# Patient Record
Sex: MALE | Race: WHITE | NOT HISPANIC OR LATINO | Employment: FULL TIME | URBAN - METROPOLITAN AREA
[De-identification: names, ages, dates, MRNs, and addresses within clinical notes are randomized per-mention and may not be internally consistent; named-entity substitution may affect disease eponyms.]

---

## 2017-11-09 ENCOUNTER — OFFICE VISIT (OUTPATIENT)
Dept: FAMILY MEDICINE | Facility: CLINIC | Age: 37
End: 2017-11-09
Payer: COMMERCIAL

## 2017-11-09 VITALS
TEMPERATURE: 98.8 F | DIASTOLIC BLOOD PRESSURE: 80 MMHG | WEIGHT: 182.2 LBS | BODY MASS INDEX: 26.08 KG/M2 | HEIGHT: 70 IN | HEART RATE: 60 BPM | SYSTOLIC BLOOD PRESSURE: 110 MMHG

## 2017-11-09 DIAGNOSIS — Z00.00 ROUTINE GENERAL MEDICAL EXAMINATION AT A HEALTH CARE FACILITY: Primary | ICD-10-CM

## 2017-11-09 LAB
CHOLEST SERPL-MCNC: 205 MG/DL
GLUCOSE SERPL-MCNC: 89 MG/DL (ref 70–99)
HDLC SERPL-MCNC: 49 MG/DL
LDLC SERPL CALC-MCNC: 91 MG/DL
NONHDLC SERPL-MCNC: 156 MG/DL
TRIGL SERPL-MCNC: 323 MG/DL

## 2017-11-09 PROCEDURE — 36415 COLL VENOUS BLD VENIPUNCTURE: CPT | Performed by: NURSE PRACTITIONER

## 2017-11-09 PROCEDURE — 82947 ASSAY GLUCOSE BLOOD QUANT: CPT | Performed by: NURSE PRACTITIONER

## 2017-11-09 PROCEDURE — 80061 LIPID PANEL: CPT | Performed by: NURSE PRACTITIONER

## 2017-11-09 PROCEDURE — 99395 PREV VISIT EST AGE 18-39: CPT | Performed by: NURSE PRACTITIONER

## 2017-11-09 NOTE — MR AVS SNAPSHOT
After Visit Summary   11/9/2017    John Nichols    MRN: [id number]           Patient Information     Date Of Birth          1980        Visit Information        Provider Department      11/9/2017 9:30 AM Edwige Avalos APRN CNP [facility]        Today's Diagnoses     Routine general medical examination at a health care facility    -  1      Care Instructions      Preventive Health Recommendations  Male Ages 26 - 39    Yearly exam:             See your health care provider every year in order to  o   Review health changes.   o   Discuss preventive care.    o   Review your medicines if your doctor has prescribed any.    You should be tested each year for STDs (sexually transmitted diseases), if you re at risk.     After age 35, talk to your provider about cholesterol testing. If you are at risk for heart disease, have your cholesterol tested at least every 5 years.     If you are at risk for diabetes, you should have a diabetes test (fasting glucose).  Shots: Get a flu shot each year. Get a tetanus shot every 10 years.     Nutrition:    Eat at least 5 servings of fruits and vegetables daily.     Eat whole-grain bread, whole-wheat pasta and brown rice instead of white grains and rice.     Talk to your provider about Calcium and Vitamin D.     Lifestyle    Exercise for at least 150 minutes a week (30 minutes a day, 5 days a week). This will help you control your weight and prevent disease.     Limit alcohol to one drink per day.     No smoking.     Wear sunscreen to prevent skin cancer.     See your dentist every six months for an exam and cleaning.             Follow-ups after your visit        Who to contact     If you have questions or need follow up information about today's clinic visit or your schedule please contact [facility] directly at [phone].  Normal or non-critical lab and imaging results will be communicated to you by MyChart, letter or  "phone within 4 business days after the clinic has received the results. If you do not hear from us within 7 days, please contact the clinic through [facility] or phone. If you have a critical or abnormal lab result, we will notify you by phone as soon as possible.  Submit refill requests through [facility] or call your pharmacy and they will forward the refill request to us. Please allow 3 business days for your refill to be completed.          Additional Information About Your Visit        [employer] Information     [facility] lets you send messages to your doctor, view your test results, renew your prescriptions, schedule appointments and more. To sign up, go to [email or web address] . Click on \"Log in\" on the left side of the screen, which will take you to the Welcome page. Then click on \"Sign up Now\" on the right side of the page.     You will be asked to enter the access code listed below, as well as some personal information. Please follow the directions to create your username and password.     Your access code is: [email or web address]  Expires: 2018 11:28 AM     Your access code will  in 90 days. If you need help or a new code, please call your [facility] clinic or [phone].        Care EveryWhere ID     This is your Care EveryWhere ID. This could be used by other organizations to access your [facility] medical records  [id number]        Your Vitals Were     Pulse Temperature Height BMI (Body Mass Index)          60 98.8  F (37.1  C) (Tympanic) 5' 9.5\" (1.765 m) 26.52 kg/m2         Blood Pressure from Last 3 Encounters:   17 110/80   14 115/60   12 104/42    Weight from Last 3 Encounters:   17 182 lb 3.2 oz (82.6 kg)   14 179 lb 6.4 oz (81.4 kg)   12 161 lb 8 oz (73.3 kg)              We Performed the Following     Glucose     Lipid panel reflex to direct LDL Fasting        Primary Care Provider Office Phone # Fax #    Gregory G Schoen, [phone]       [phone] [place] [address]        Equal Access to Services     THALIA PRATT : Hadii mike Sepulveda, dennis jeffrey, minnie rankin, jaleesa suggs. So [facility] [phone].    ATENCIÓN: Si habla español, tiene a catalan disposición servicios gratuitos de asistencia lingüística. Llame al [phone].    We comply with applicable federal civil rights laws and [place] laws. We do not discriminate on the basis of race, color, national origin, age, disability, sex, sexual orientation, or gender identity.            Thank you!     Thank you for choosing [facility]  for your care. Our goal is always to provide you with excellent care. Hearing back from our patients is one way we can continue to improve our services. Please take a few minutes to complete the written survey that you may receive in the mail after your visit with us. Thank you!             Your Updated Medication List - Protect others around you: Learn how to safely use, store and throw away your medicines at www.disposemymeds.org.      Notice  As of 11/9/2017 11:59 PM    You have not been prescribed any medications.

## 2017-11-09 NOTE — NURSING NOTE
"Chief Complaint   Patient presents with     Physical       Initial There were no vitals taken for this visit. Estimated body mass index is 26.11 kg/(m^2) as calculated from the following:    Height as of 9/4/12: 5' 9.5\" (1.765 m).    Weight as of 6/25/14: 179 lb 6.4 oz (81.4 kg).  Medication Reconciliation: complete  "
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
## 2018-02-23 ENCOUNTER — OFFICE VISIT (OUTPATIENT)
Dept: FAMILY MEDICINE | Facility: CLINIC | Age: 38
End: 2018-02-23
Payer: COMMERCIAL

## 2018-02-23 VITALS
WEIGHT: 182 LBS | HEART RATE: 82 BPM | DIASTOLIC BLOOD PRESSURE: 60 MMHG | SYSTOLIC BLOOD PRESSURE: 110 MMHG | TEMPERATURE: 97.3 F | OXYGEN SATURATION: 100 % | HEIGHT: 69 IN | BODY MASS INDEX: 26.96 KG/M2

## 2018-02-23 DIAGNOSIS — B35.1 ONYCHOMYCOSIS: Primary | ICD-10-CM

## 2018-02-23 PROCEDURE — 99213 OFFICE O/P EST LOW 20 MIN: CPT | Performed by: NURSE PRACTITIONER

## 2018-02-23 RX ORDER — TERBINAFINE HYDROCHLORIDE 250 MG/1
250 TABLET ORAL DAILY
Qty: 90 TABLET | Refills: 0 | Status: SHIPPED | OUTPATIENT
Start: 2018-02-23 | End: 2018-09-19

## 2018-02-23 NOTE — PROGRESS NOTES
"  SUBJECTIVE:   John Nichols is a 37 year old male who presents to clinic today for the following health issues:      Concern - toenail fungus  Onset: ongoing for years    Description:   All toenails, increased fungal infection    Intensity: moderate    Progression of Symptoms:  worsening    Accompanying Signs & Symptoms:  Painful, yellow spots in the nail    Previous history of similar problem:   Has had fungus for years, has not had any Rx for this    Precipitating factors:   Worsened by: none    Alleviating factors:  Improved by: none    Therapies Tried and outcome: none    Patient reports having had an issue with toenail fungus since his teenage years.  He has never taken oral medication for this, but has discussed it in the past with his primary care provider.  The worst problem involves the right great toe.  He states as this grows out it becomes very thick and yellow.  He will trim it back, and now it seems to have pretty much disintegrated, to the point that when he trims it there is almost nothing left on the toe.  It actually becomes painful because he has no protective nail.    Problem list and histories reviewed & adjusted, as indicated.  Additional history: as documented    BP Readings from Last 3 Encounters:   02/23/18 110/60   11/09/17 110/80   06/25/14 115/60    Wt Readings from Last 3 Encounters:   02/23/18 182 lb (82.6 kg)   11/09/17 182 lb 3.2 oz (82.6 kg)   06/25/14 179 lb 6.4 oz (81.4 kg)                    Reviewed and updated as needed this visit by clinical staff       Reviewed and updated as needed this visit by Provider         ROS:  Constitutional, HEENT, cardiovascular, pulmonary, gi and gu systems are negative, except as otherwise noted.    OBJECTIVE:     /60  Pulse 82  Temp 97.3  F (36.3  C) (Temporal)  Ht 5' 9\" (1.753 m)  Wt 182 lb (82.6 kg)  SpO2 100%  BMI 26.88 kg/m2  Body mass index is 26.88 kg/(m^2).   GENERAL: healthy, alert and no distress  RESP: lungs clear " to auscultation - no rales, rhonchi or wheezes  CV: regular rate and rhythm, normal S1 S2, no S3 or S4, no murmur, click or rub, no peripheral edema and peripheral pulses strong  ABDOMEN: soft, nontender, no hepatosplenomegaly, no masses and bowel sounds normal  SKIN: The nail of the right great toe was pretty much nonexistent.  There is some debris in very discolored nail left at the base.  There is yellow debris beneath the left great toenail, some white discoloration and very brittle appearance to the rest of the toenails.        ASSESSMENT/PLAN:     Problem List Items Addressed This Visit        Medium    Onychomycosis - Primary    Relevant Medications    terbinafine (LAMISIL) 250 MG tablet    Other Relevant Orders    Hepatic panel (Albumin, ALT, AST, Bili, Alk Phos, TP)           Terbinafine 250 mg 1 tablet daily for 12 weeks  Potential side effects were reviewed with the patient  Will obtain baseline liver function tests today, recheck in 1 month.    LENNIE Chau [facility]

## 2018-02-23 NOTE — LETTER
[address]   [address]  Tel. [phone]/ Fax [phone]    May 23, 2018        John Nichols  [address] [address] [place] [address]          Dear Mr. John Nichols:    Your previous orders for lab work have been extended.  Please call to schedule a lab appointment and return to the clinic to have the labs drawn at your earliest convenience.    If you are required to be fasting for these tests,  remember to have nothing by mouth (except water) after midnight on the night before your test.    If you have any questions or concerns, please contact our office.    Sincerely,       Edwieg Avalos, NP care team

## 2018-02-23 NOTE — MR AVS SNAPSHOT
"              After Visit Summary   2/23/2018    John Nichols    MRN: [id number]           Patient Information     Date Of Birth          1980        Visit Information        Provider Department      2/23/2018 10:00 AM Edwige Avalos APRN CNP [facility]        Today's Diagnoses     Onychomycosis    -  1       Follow-ups after your visit        Future tests that were ordered for you today     Open Future Orders        Priority Expected Expires Ordered    Hepatic panel (Albumin, ALT, AST, Bili, Alk Phos, TP) Routine 3/23/2018 4/6/2018 2/23/2018            Who to contact     If you have questions or need follow up information about today's clinic visit or your schedule please contact [facility] directly at [phone].  Normal or non-critical lab and imaging results will be communicated to you by MyChart, letter or phone within 4 business days after the clinic has received the results. If you do not hear from us within 7 days, please contact the clinic through MyChart or phone. If you have a critical or abnormal lab result, we will notify you by phone as soon as possible.  Submit refill requests through [email or web address] or call your pharmacy and they will forward the refill request to us. Please allow 3 business days for your refill to be completed.          Additional Information About Your Visit        MyChart Information     [email or web address] lets you send messages to your doctor, view your test results, renew your prescriptions, schedule appointments and more. To sign up, go to [email or web address] . Click on \"Log in\" on the left side of the screen, which will take you to the Welcome page. Then click on \"Sign up Now\" on the right side of the page.     You will be asked to enter the access code listed below, as well as some personal information. Please follow the directions to create your username and password.     Your access code is: [email or web address]  Expires: 5/24/2018 10:33 AM   " "  Your access code will  in 90 days. If you need help or a new code, please call your [place] clinic or [phone].        Care EveryWhere ID     This is your Care EveryWhere ID. This could be used by other organizations to access your [place] medical records  [id number]        Your Vitals Were     Pulse Temperature Height Pulse Oximetry BMI (Body Mass Index)       82 97.3  F (36.3  C) (Temporal) 5' 9\" (1.753 m) 100% 26.88 kg/m2        Blood Pressure from Last 3 Encounters:   18 110/60   17 110/80   14 115/60    Weight from Last 3 Encounters:   18 182 lb (82.6 kg)   17 182 lb 3.2 oz (82.6 kg)   14 179 lb 6.4 oz (81.4 kg)              We Performed the Following     Hepatic panel (Albumin, ALT, AST, Bili, Alk Phos, TP)          Today's Medication Changes          These changes are accurate as of 18 10:33 AM.  If you have any questions, ask your nurse or doctor.               Start taking these medicines.        Dose/Directions    terbinafine 250 MG tablet   Commonly known as:  lamISIL   Used for:  Onychomycosis   Started by:  Edwige Avalos APRN CNP        Dose:  250 mg   Take 1 tablet (250 mg) by mouth daily   Quantity:  90 tablet   Refills:  0            Where to get your medicines      These medications were sent to [facility] [place] [address] [address]  [facility] [address]     Phone:  [phone]     terbinafine 250 MG tablet                Primary Care Provider Office Phone # Fax #    Gregory G Schoen, [phone]       [facility] [place] [facility] [address]        Equal Access to Services     [facility] SANTA AH: Ricardo Sepulveda, waaxda luqadaha, qaybta kaalmada massiel, jaleesa suggs. So [facility] [phone].    ATENCIÓN: Si habla español, tiene a catalan disposición servicios gratuitos de asistencia lingüística. Jewel al [phone].    We comply with " applicable federal civil rights laws and [place] laws. We do not discriminate on the basis of race, color, national origin, age, disability, sex, sexual orientation, or gender identity.            Thank you!     Thank you for choosing [facility]  for your care. Our goal is always to provide you with excellent care. Hearing back from our patients is one way we can continue to improve our services. Please take a few minutes to complete the written survey that you may receive in the mail after your visit with us. Thank you!             Your Updated Medication List - Protect others around you: Learn how to safely use, store and throw away your medicines at www.disposemymeds.org.          This list is accurate as of 2/23/18 10:33 AM.  Always use your most recent med list.                   Brand Name Dispense Instructions for use Diagnosis    terbinafine 250 MG tablet    lamISIL    90 tablet    Take 1 tablet (250 mg) by mouth daily    Onychomycosis

## 2018-02-23 NOTE — LETTER
[address]   [address]  Tel. [phone]/ Fax [phone]    April 18, 2018        John Nichols  [address] [address] [place] [id number]          Dear Mr. John Nichols:    Your previous orders for lab work have been extended.  Please call to schedule a lab appointment and return to the clinic to have the labs drawn at your earliest convenience.    If you are required to be fasting for these tests,  remember to have nothing by mouth (except water) after midnight on the night before your test.    If you have any questions or concerns, please contact our office.    Sincerely,       Edwige Avalos, NP care team

## 2018-05-02 ENCOUNTER — TELEPHONE (OUTPATIENT)
Dept: FAMILY MEDICINE | Facility: CLINIC | Age: 38
End: 2018-05-02

## 2018-05-02 NOTE — TELEPHONE ENCOUNTER
Prior Authorization Retail Medication Request    Medication/Dose: lamisil 250 mg  ICD code (if different than what is on RX):     Previously Tried and Failed:     Rationale:       Insurance Name:  Preferred one  Insurance ID:  [id number]      Pharmacy Information (if different than what is on RX)  Name:     Phone:

## 2018-05-03 NOTE — TELEPHONE ENCOUNTER
PA Initiation    Medication: LAMISIL 250MG  Insurance Company: [employer] - Phone [phone] Fax [phone]  Pharmacy Filling the Rx: [place] [address] [address]  Filling Pharmacy Phone: [phone]  Filling Pharmacy Fax:    Start Date: 5/3/2018

## 2018-05-10 NOTE — TELEPHONE ENCOUNTER
Prior Authorization Approval    Authorization Effective Date: 5/9/2018  Authorization Expiration Date: 8/9/2018  Medication: LAMISIL 250MG - APPROVED  Approved Dose/Quantity: daily  Reference #: [id number]   Insurance Company: [employer] - Phone [phone] Fax [phone]  Expected CoPay: $11.45     CoPay Card Available:      Foundation Assistance Needed:    Which Pharmacy is filling the prescription (Not needed for infusion/clinic administered): [facility] [address]   Pharmacy Notified: Yes  Patient Notified: No

## 2018-09-19 ENCOUNTER — OFFICE VISIT (OUTPATIENT)
Dept: FAMILY MEDICINE | Facility: OTHER | Age: 38
End: 2018-09-19
Payer: COMMERCIAL

## 2018-09-19 VITALS
BODY MASS INDEX: 26.43 KG/M2 | SYSTOLIC BLOOD PRESSURE: 110 MMHG | TEMPERATURE: 98.1 F | OXYGEN SATURATION: 99 % | RESPIRATION RATE: 16 BRPM | HEART RATE: 77 BPM | WEIGHT: 179 LBS | DIASTOLIC BLOOD PRESSURE: 60 MMHG

## 2018-09-19 DIAGNOSIS — J20.9 ACUTE BRONCHITIS WITH SYMPTOMS > 10 DAYS: Primary | ICD-10-CM

## 2018-09-19 PROCEDURE — 99213 OFFICE O/P EST LOW 20 MIN: CPT | Performed by: NURSE PRACTITIONER

## 2018-09-19 RX ORDER — AZITHROMYCIN 250 MG/1
TABLET, FILM COATED ORAL
Qty: 6 TABLET | Refills: 0 | Status: SHIPPED | OUTPATIENT
Start: 2018-09-19 | End: 2020-10-05

## 2018-09-19 ASSESSMENT — PAIN SCALES - GENERAL: PAINLEVEL: NO PAIN (0)

## 2018-09-19 NOTE — MR AVS SNAPSHOT
After Visit Summary   9/19/2018    John Nichols    MRN: [id number]           Patient Information     Date Of Birth          1980        Visit Information        Provider Department      9/19/2018 2:40 PM Garima Gruber APRN CNP [facility]        Today's Diagnoses     Acute bronchitis with symptoms > 10 days    -  1      Care Instructions    - Start azithormycin medication today  - Recommend OTC cough medication as needed  - Hot water with honey and lemon to help suppress cough or cough drops  - Follow up if symptoms do not improve or worsen  - If you develop fevers/chills, shortness of breath or chest pain please go in to be evaluated.     LENNIE Lowery CNP            Follow-ups after your visit        Follow-up notes from your care team     Return in about 2 months (around 11/19/2018) for Physical Exam.      Who to contact     If you have questions or need follow up information about today's clinic visit or your schedule please contact [facility] directly at [phone].  Normal or non-critical lab and imaging results will be communicated to you by MyChart, letter or phone within 4 business days after the clinic has received the results. If you do not hear from us within 7 days, please contact the clinic through MyChart or phone. If you have a critical or abnormal lab result, we will notify you by phone as soon as possible.  Submit refill requests through [employer] or call your pharmacy and they will forward the refill request to us. Please allow 3 business days for your refill to be completed.          Additional Information About Your Visit        Care EveryWhere ID     This is your Care EveryWhere ID. This could be used by other organizations to access your [place] medical records  [id number]        Your Vitals Were     Pulse Temperature Respirations Pulse Oximetry BMI (Body Mass Index)       77 98.1  F (36.7  C) 16 99% 26.43 kg/m2         Blood Pressure from Last 3 Encounters:   09/19/18 110/60   02/23/18 110/60   11/09/17 110/80    Weight from Last 3 Encounters:   09/19/18 179 lb (81.2 kg)   02/23/18 182 lb (82.6 kg)   11/09/17 182 lb 3.2 oz (82.6 kg)              Today, you had the following     No orders found for display         Today's Medication Changes          These changes are accurate as of 9/19/18  2:58 PM.  If you have any questions, ask your nurse or doctor.               Start taking these medicines.        Dose/Directions    azithromycin 250 MG tablet   Commonly known as:  ZITHROMAX   Used for:  Acute bronchitis with symptoms > 10 days   Started by:  Garima Gruber APRN CNP        Two tablets first day, then one tablet daily for four days.   Quantity:  6 tablet   Refills:  0            Where to get your medicines      These medications were sent to [facility] [address]   [address] [facility] , [facility] [address]     Phone:  [phone]     azithromycin 250 MG tablet                Primary Care Provider Office Phone # Fax #    Edwige LENNIE Alexandra [phone]       [address] [facility]   [facility] [place] [address]        Equal Access to Services     THALIA PRATT AH: Hadii mike pretty hadasho Soomaali, waaxda luqadaha, qaybta kaalmada adeegyada, jaleesa suggs. So [facility] [phone].    ATENCIÓN: Si habla español, tiene a catalan disposición servicios gratuitos de asistencia lingüística. Llame al [phone].    We comply with applicable federal civil rights laws and [place] laws. We do not discriminate on the basis of race, color, national origin, age, disability, sex, sexual orientation, or gender identity.            Thank you!     Thank you for choosing [facility]  for your care. Our goal is always to provide you with excellent care. Hearing back from our patients is one way we can continue to improve our services. Please take a few minutes to complete  the written survey that you may receive in the mail after your visit with us. Thank you!             Your Updated Medication List - Protect others around you: Learn how to safely use, store and throw away your medicines at www.disposemymeds.org.          This list is accurate as of 9/19/18  2:58 PM.  Always use your most recent med list.                   Brand Name Dispense Instructions for use Diagnosis    azithromycin 250 MG tablet    ZITHROMAX    6 tablet    Two tablets first day, then one tablet daily for four days.    Acute bronchitis with symptoms > 10 days

## 2018-09-19 NOTE — PATIENT INSTRUCTIONS
- Start azithormycin medication today  - Recommend OTC cough medication as needed  - Hot water with honey and lemon to help suppress cough or cough drops  - Follow up if symptoms do not improve or worsen  - If you develop fevers/chills, shortness of breath or chest pain please go in to be evaluated.     LENNIE Lowery CNP

## 2018-09-19 NOTE — PROGRESS NOTES
SUBJECTIVE:   John Nichols is a 38 year old male who presents to clinic today for the following health issues:      HPI  Acute Illness   Acute illness concerns: Cough    Onset:   3 wks    Fever: no     Chills/Sweats: no     Headache (location?): no     Sinus Pressure:no    Conjunctivitis:  no    Ear Pain: no    Rhinorrhea: no     Congestion: YES  initially    Sore Throat: YES  Initially - not now     Cough: YES-productive of clear sputum,  Coughing so hard that he threw up yesterday morning worsening over time    Wheeze: no     Decreased Appetite: no     Nausea: no     Vomiting: no     Diarrhea:  no     Dysuria/Freq.: no     Fatigue/Achiness: YES   At first    Sick/Strep Exposure: no      Therapies Tried and outcome:   None            Problem list and histories reviewed & adjusted, as indicated.  Additional history: as documented      Current Outpatient Prescriptions   Medication Sig Dispense Refill     azithromycin (ZITHROMAX) 250 MG tablet Two tablets first day, then one tablet daily for four days. 6 tablet 0       ROS:  CONSTITUTIONAL: NEGATIVE for fever, chills, change in weight  RESP: NEGATIVE for SOB  CV: NEGATIVE for chest pain, palpitations or peripheral edema    OBJECTIVE:     /60  Pulse 77  Temp 98.1  F (36.7  C)  Resp 16  Wt 179 lb (81.2 kg)  SpO2 99%  BMI 26.43 kg/m2  Body mass index is 26.43 kg/(m^2).  GENERAL: healthy, alert and no distress  EYES: Eyes grossly normal to inspection, PERRL and conjunctivae and sclerae normal  HENT: ear canals and TM's normal, nose and mouth without ulcers or lesions  NECK: no adenopathy, no asymmetry, masses, or scars and thyroid normal to palpation  RESP: lungs clear to auscultation - no rales, rhonchi or wheezes  CV: regular rate and rhythm, normal S1 S2, no S3 or S4, no murmur, click or rub, no peripheral edema and peripheral pulses strong  SKIN: no suspicious lesions or rashes  NEURO: Normal strength and tone, mentation intact and speech  normal  PSYCH: mentation appears normal, affect normal/bright    Diagnostic Test Results:  none     ASSESSMENT/PLAN:         1. Acute bronchitis with symptoms > 10 days  - Symptoms consistent with an acute URI, with ongoing cough that is productive. He denies shortness of breath, fatigue, fevers, and reports that he is feeling mildly better with an aggravating cough that caused him to almost vomit yesterday. He denies any seasonal allergies. At this time I will cover him for atypical bacteria with azithromycin. Counseled him on home care remedies and advised to return to clinic if symptoms worsen or do not improve (see patient instructions).   - azithromycin (ZITHROMAX) 250 MG tablet; Two tablets first day, then one tablet daily for four days.  Dispense: 6 tablet; Refill: 0    Reminded him to schedule physical exam, this is due for him in November.     See Patient Instructions  Patient Instructions   - Start azithormycin medication today  - Recommend OTC cough medication as needed  - Hot water with honey and lemon to help suppress cough or cough drops  - Follow up if symptoms do not improve or worsen  - If you develop fevers/chills, shortness of breath or chest pain please go in to be evaluated.     LENNIE Lowery CNP, APRN CNP  [facility]

## 2020-10-05 ENCOUNTER — OFFICE VISIT (OUTPATIENT)
Dept: FAMILY MEDICINE | Facility: CLINIC | Age: 40
End: 2020-10-05
Payer: COMMERCIAL

## 2020-10-05 VITALS
HEART RATE: 91 BPM | DIASTOLIC BLOOD PRESSURE: 79 MMHG | OXYGEN SATURATION: 98 % | HEIGHT: 68 IN | BODY MASS INDEX: 26.6 KG/M2 | TEMPERATURE: 97.9 F | RESPIRATION RATE: 16 BRPM | SYSTOLIC BLOOD PRESSURE: 120 MMHG | WEIGHT: 175.5 LBS

## 2020-10-05 DIAGNOSIS — L72.0 EPIDERMOID CYST OF SKIN: Primary | ICD-10-CM

## 2020-10-05 PROCEDURE — 99212 OFFICE O/P EST SF 10 MIN: CPT | Performed by: NURSE PRACTITIONER

## 2020-10-05 ASSESSMENT — PAIN SCALES - GENERAL: PAINLEVEL: NO PAIN (0)

## 2020-10-05 ASSESSMENT — MIFFLIN-ST. JEOR: SCORE: 1680.56

## 2020-10-05 NOTE — PATIENT INSTRUCTIONS
Patient Education     Understanding Epidermoid Cyst    An epidermoid cyst is a small abnormal growth in the top layers of the skin. It is filled with keratin, the same proteins that make up your hair and nails. These cysts grow slowly. They can grow anywhere on the body. But they are most often found on the face, behind the ears, and on the chest or upper back.  How to say it  [id number] aleenat   What causes an epidermoid cyst?  An epidermoid cyst may occur because of an injury to the skin or from acne.  Symptoms of an epidermoid cyst  An epidermoid cyst is a subcutaneous bump. This means it is just below the skin. It may be yellow or skin-colored. It often has a small black deann in the middle of it, like a blackhead.  An epidermoid cyst rarely causes pain, unless it ruptures or becomes infected. It may ooze keratin, a white, cheesy, smelly material. If the cyst becomes inflamed or infected, it may be:    Bad smelling    Red    Swollen    Tender  Treatment for an epidermoid cyst  Many epidermoid cysts don t cause any problems. They don t necessarily need to be treated. They may go away on their own. But you may want to treat it if you don t like how it looks or it becomes inflamed.  Treatment options include:    Steroid injection. A steroid may be injected into the cyst. This may help ease inflammation. It may also prevent infection.    Surgical removal. The cyst can be cut out. It may also need to be drained first. There is a slight chance the cyst may come back.    Antibiotics. In some cases, you may need to take an oral antibiotic to prevent infection and regrowth.  When to call your healthcare provider  Call your healthcare provider right away if you have any of these:    Fever of 100.4 F (38 C) or higher, or as directed    Redness, swelling, or fluid leaking from your incision that gets worse    Pain that gets worse    Symptoms that don t get better, or get worse    New symptoms   Date Last Reviewed:  5/1/2016 2000-2019 [employer]. [address] [place] [address], [place] [address]. All rights reserved. This information is not intended as a substitute for professional medical care. Always follow your healthcare professional's instructions.           Patient Education     Understanding Epidermoid Cyst    An epidermoid cyst is a small abnormal growth in the top layers of the skin. It is filled with keratin, the same proteins that make up your hair and nails. These cysts grow slowly. They can grow anywhere on the body. But they are most often found on the face, behind the ears, and on the chest or upper back.  How to say it  [id number] sist   What causes an epidermoid cyst?  An epidermoid cyst may occur because of an injury to the skin or from acne.  Symptoms of an epidermoid cyst  An epidermoid cyst is a subcutaneous bump. This means it is just below the skin. It may be yellow or skin-colored. It often has a small black deann in the middle of it, like a blackhead.  An epidermoid cyst rarely causes pain, unless it ruptures or becomes infected. It may ooze keratin, a white, cheesy, smelly material. If the cyst becomes inflamed or infected, it may be:    Bad smelling    Red    Swollen    Tender  Treatment for an epidermoid cyst  Many epidermoid cysts don t cause any problems. They don t necessarily need to be treated. They may go away on their own. But you may want to treat it if you don t like how it looks or it becomes inflamed.  Treatment options include:    Steroid injection. A steroid may be injected into the cyst. This may help ease inflammation. It may also prevent infection.    Surgical removal. The cyst can be cut out. It may also need to be drained first. There is a slight chance the cyst may come back.    Antibiotics. In some cases, you may need to take an oral antibiotic to prevent infection and regrowth.  When to call your healthcare provider  Call your healthcare provider right away if you  have any of these:    Fever of 100.4 F (38 C) or higher, or as directed    Redness, swelling, or fluid leaking from your incision that gets worse    Pain that gets worse    Symptoms that don t get better, or get worse    New symptoms   Date Last Reviewed: 5/1/2016 2000-2019 [employer]. [address] [place] [address], [place] [address]. All rights reserved. This information is not intended as a substitute for professional medical care. Always follow your healthcare professional's instructions.

## 2020-10-07 ENCOUNTER — OFFICE VISIT (OUTPATIENT)
Dept: SURGERY | Facility: CLINIC | Age: 40
End: 2020-10-07
Payer: COMMERCIAL

## 2020-10-07 VITALS
OXYGEN SATURATION: 98 % | SYSTOLIC BLOOD PRESSURE: 112 MMHG | DIASTOLIC BLOOD PRESSURE: 64 MMHG | RESPIRATION RATE: 16 BRPM | BODY MASS INDEX: 26.32 KG/M2 | HEART RATE: 104 BPM | WEIGHT: 173.1 LBS

## 2020-10-07 DIAGNOSIS — L72.0 EPIDERMAL CYST: Primary | ICD-10-CM

## 2020-10-07 PROCEDURE — 12031 INTMD RPR S/A/T/EXT 2.5 CM/<: CPT | Performed by: SURGERY

## 2020-10-07 PROCEDURE — 99203 OFFICE O/P NEW LOW 30 MIN: CPT | Mod: 25 | Performed by: SURGERY

## 2020-10-07 PROCEDURE — 11402 EXC TR-EXT B9+MARG 1.1-2 CM: CPT | Mod: 51 | Performed by: SURGERY

## 2020-10-07 NOTE — LETTER
10/7/2020         RE: John Nichols  [address]  [facility] [address]        Dear Colleague,    Thank you for referring your patient, John Nichols, to the [facility]. Please see a copy of my visit note below.    Patient seen in consultation for epidermal cyst by Edwige Avalos    HPI:  Patient is a 40 year old male with at least several months of cyst on back. He says that there might have been some drainage when he first noticed the cyst but none recently. He has history of cyst excision ~20 years ago in a different location on the back. He does not take any blood thinners. No allergies. He would like it excised    Review Of Systems    Skin: as above  Ears/Nose/Throat: negative  Respiratory: No shortness of breath, dyspnea on exertion, cough, or hemoptysis  Cardiovascular: negative  Gastrointestinal: negative  Genitourinary: negative  Musculoskeletal: negative  Neurologic: negative  Hematologic/Lymphatic/Immunologic: negative  Endocrine: negative      Past Medical History:   Diagnosis Date     NO ACTIVE PROBLEMS        Past Surgical History:   Procedure Laterality Date     ZZC NONSPECIFIC PROCEDURE      Cyst removal on back       Family History   Problem Relation Age of Onset     Diabetes Mother      Hypertension Father      Alcohol/Drug Father      Lipids Father        Social History     Socioeconomic History     Marital status:      Spouse name: Not on file     Number of children: Not on file     Years of education: Not on file     Highest education level: Not on file   Occupational History     Occupation:    Social Needs     Financial resource strain: Not on file     Food insecurity     Worry: Not on file     Inability: Not on file     Transportation needs     Medical: Not on file     Non-medical: Not on file   Tobacco Use     Smoking status: Former Smoker     Smokeless tobacco: Never Used     Tobacco comment: Quit yrs ago-2002   Substance  and Sexual Activity     Alcohol use: Yes     Comment: 1 beer per day; few on Friday/Saturday night     Drug use: No     Sexual activity: Yes     Partners: Female   Lifestyle     Physical activity     Days per week: Not on file     Minutes per session: Not on file     Stress: Not on file   Relationships     Social connections     Talks on phone: Not on file     Gets together: Not on file     Attends Synagogue service: Not on file     Active member of club or organization: Not on file     Attends meetings of clubs or organizations: Not on file     Relationship status: Not on file     Intimate partner violence     Fear of current or ex partner: Not on file     Emotionally abused: Not on file     Physically abused: Not on file     Forced sexual activity: Not on file   Other Topics Concern      Service No     Blood Transfusions No     Caffeine Concern Yes     Comment: affects irregular heart beat     Occupational Exposure Yes     Comment: noise and heat      Hobby Hazards No     Sleep Concern No     Stress Concern No     Weight Concern No     Special Diet No     Back Care No     Exercise Yes     Comment: occ     Bike Helmet No     Seat Belt Yes     Self-Exams Yes     Parent/sibling w/ CABG, MI or angioplasty before 65F 55M? Not Asked   Social History Narrative     Not on file       No current outpatient medications on file.       Medications and history reviewed    Physical exam:  Vitals: /64   Pulse 104   Resp 16   Wt 78.5 kg (173 lb 1.6 oz)   SpO2 98%   BMI 26.32 kg/m    BMI= Body mass index is 26.32 kg/m .    Constitutional: Healthy, alert, non-distressed   Head: Normo-cephalic, atraumatic, no lesions, masses or tenderness   Cardiovascular: RRR, no new murmurs, +S1, +S2 heart sounds, no clicks, rubs or gallops   Respiratory: CTAB, no rales, rhonchi or wheezing, equal chest rise, good respiratory effort   Gastrointestinal: Soft, non-tender, non distended, no rebound rigidity or guarding, no masses or  hernias palpated   : Deferred  Musculoskeletal: Moves all extremities, normal  strength, no deformities noted   Skin: ~1cm cyst on back, no signs of infection, no erythema or drainage   Psychiatric: Mentation appears normal, affect appropriate   Hematologic/Lymphatic/Immunologic: Normal cervical and supraclavicular lymph nodes   Patient able to get up on table without difficulty.    Labs show:  No results found for this or any previous visit (from the past 24 hour(s)).    Imaging shows:  none    Assessment:     ICD-10-CM    1. Epidermal cyst  L72.0      Plan: I recommend in office excision of the cyst. We discussed the procedure in detail. After our discussion he wished to proceed with excision. See below for description of the procedure.    José Luis Calles DO     PROCEDURE: excision epidermal cyst, back   Written consent was obtained    The back area was prepped and appropriately anesthetized with 1% lidocaine with epinephrine. Using the usual technique, excision of epidermal cyst was performed. The total area excised, including lesion and margins was 1.2 x 1.0 x 1.0 cm.  Closure was accomplished with interrupted 3-0 vicryl for the dermal layer and running subcuticular 4-0 monocryl for the skin. Total length of the incision after closure was 1.8 cm. An appropriate  dressing was applied.  The procedure was well tolerated and without complications. Specimen was not sent to Pathology.            Again, thank you for allowing me to participate in the care of your patient.        Sincerely,        José Luis Calles DO

## 2020-10-07 NOTE — PROGRESS NOTES
Patient seen in consultation for epidermal cyst by Edwige Avalos    HPI:  Patient is a 40 year old male with at least several months of cyst on back. He says that there might have been some drainage when he first noticed the cyst but none recently. He has history of cyst excision ~20 years ago in a different location on the back. He does not take any blood thinners. No allergies. He would like it excised    Review Of Systems    Skin: as above  Ears/Nose/Throat: negative  Respiratory: No shortness of breath, dyspnea on exertion, cough, or hemoptysis  Cardiovascular: negative  Gastrointestinal: negative  Genitourinary: negative  Musculoskeletal: negative  Neurologic: negative  Hematologic/Lymphatic/Immunologic: negative  Endocrine: negative      Past Medical History:   Diagnosis Date     NO ACTIVE PROBLEMS        Past Surgical History:   Procedure Laterality Date     ZZC NONSPECIFIC PROCEDURE      Cyst removal on back       Family History   Problem Relation Age of Onset     Diabetes Mother      Hypertension Father      Alcohol/Drug Father      Lipids Father        Social History     Socioeconomic History     Marital status:      Spouse name: Not on file     Number of children: Not on file     Years of education: Not on file     Highest education level: Not on file   Occupational History     Occupation:    Social Needs     Financial resource strain: Not on file     Food insecurity     Worry: Not on file     Inability: Not on file     Transportation needs     Medical: Not on file     Non-medical: Not on file   Tobacco Use     Smoking status: Former Smoker     Smokeless tobacco: Never Used     Tobacco comment: Quit yrs ago-2002   Substance and Sexual Activity     Alcohol use: Yes     Comment: 1 beer per day; few on Friday/Saturday night     Drug use: No     Sexual activity: Yes     Partners: Female   Lifestyle     Physical activity     Days per week: Not on file     Minutes per session: Not on file      Stress: Not on file   Relationships     Social connections     Talks on phone: Not on file     Gets together: Not on file     Attends Gnosticist service: Not on file     Active member of club or organization: Not on file     Attends meetings of clubs or organizations: Not on file     Relationship status: Not on file     Intimate partner violence     Fear of current or ex partner: Not on file     Emotionally abused: Not on file     Physically abused: Not on file     Forced sexual activity: Not on file   Other Topics Concern      Service No     Blood Transfusions No     Caffeine Concern Yes     Comment: affects irregular heart beat     Occupational Exposure Yes     Comment: noise and heat      Hobby Hazards No     Sleep Concern No     Stress Concern No     Weight Concern No     Special Diet No     Back Care No     Exercise Yes     Comment: occ     Bike Helmet No     Seat Belt Yes     Self-Exams Yes     Parent/sibling w/ CABG, MI or angioplasty before 65F 55M? Not Asked   Social History Narrative     Not on file       No current outpatient medications on file.       Medications and history reviewed    Physical exam:  Vitals: /64   Pulse 104   Resp 16   Wt 78.5 kg (173 lb 1.6 oz)   SpO2 98%   BMI 26.32 kg/m    BMI= Body mass index is 26.32 kg/m .    Constitutional: Healthy, alert, non-distressed   Head: Normo-cephalic, atraumatic, no lesions, masses or tenderness   Cardiovascular: RRR, no new murmurs, +S1, +S2 heart sounds, no clicks, rubs or gallops   Respiratory: CTAB, no rales, rhonchi or wheezing, equal chest rise, good respiratory effort   Gastrointestinal: Soft, non-tender, non distended, no rebound rigidity or guarding, no masses or hernias palpated   : Deferred  Musculoskeletal: Moves all extremities, normal  strength, no deformities noted   Skin: ~1cm cyst on back, no signs of infection, no erythema or drainage   Psychiatric: Mentation appears normal, affect appropriate    Hematologic/Lymphatic/Immunologic: Normal cervical and supraclavicular lymph nodes   Patient able to get up on table without difficulty.    Labs show:  No results found for this or any previous visit (from the past 24 hour(s)).    Imaging shows:  none    Assessment:     ICD-10-CM    1. Epidermal cyst  L72.0      Plan: I recommend in office excision of the cyst. We discussed the procedure in detail. After our discussion he wished to proceed with excision. See below for description of the procedure.    José Luis Calles, DO     PROCEDURE: excision epidermal cyst, back   Written consent was obtained    The back area was prepped and appropriately anesthetized with 1% lidocaine with epinephrine. Using the usual technique, excision of epidermal cyst was performed. The total area excised, including lesion and margins was 1.2 x 1.0 x 1.0 cm.  Closure was accomplished with interrupted 3-0 vicryl for the dermal layer and running subcuticular 4-0 monocryl for the skin. Total length of the incision after closure was 1.8 cm. An appropriate  dressing was applied.  The procedure was well tolerated and without complications. Specimen was not sent to Pathology.

## 2020-12-31 ENCOUNTER — TELEPHONE (OUTPATIENT)
Dept: FAMILY MEDICINE | Facility: CLINIC | Age: 40
End: 2020-12-31

## 2020-12-31 NOTE — TELEPHONE ENCOUNTER
Patient has an irregular heart beat and mom is after him about his heart and some previous testing that was completed.      New wife and him are talking about having children and he had a vasectomy years ago and looking for option.

## 2020-12-31 NOTE — TELEPHONE ENCOUNTER
"Patient sent a web request:    \"Questions about fertility options and my heart condition.\"    Patient is established with Edwige RANGEL at  Clinic.    Has she been seen for heart condiction before?    Also patient needs to scheduled with OB.    Patient would like to schedule at  Clinic at 10:00 am.    Please contact patient.    Thank you.    Central Scheduling  Raquel COYLE"

## 2021-01-15 ENCOUNTER — HEALTH MAINTENANCE LETTER (OUTPATIENT)
Age: 41
End: 2021-01-15

## 2021-01-20 ASSESSMENT — ENCOUNTER SYMPTOMS
NERVOUS/ANXIOUS: 0
NAUSEA: 0
DIZZINESS: 0
SHORTNESS OF BREATH: 0
CHILLS: 0
PALPITATIONS: 0
HEMATOCHEZIA: 0
FREQUENCY: 0
EYE PAIN: 0
ARTHRALGIAS: 0
FEVER: 0
HEMATURIA: 0
WEAKNESS: 0
JOINT SWELLING: 0
CONSTIPATION: 0
MYALGIAS: 0
DIARRHEA: 0
ABDOMINAL PAIN: 0
HEARTBURN: 0
COUGH: 0
DYSURIA: 0
PARESTHESIAS: 0
SORE THROAT: 0
HEADACHES: 0

## 2021-01-21 ENCOUNTER — OFFICE VISIT (OUTPATIENT)
Dept: FAMILY MEDICINE | Facility: CLINIC | Age: 41
End: 2021-01-21
Payer: COMMERCIAL

## 2021-01-21 VITALS
RESPIRATION RATE: 14 BRPM | HEART RATE: 84 BPM | SYSTOLIC BLOOD PRESSURE: 98 MMHG | DIASTOLIC BLOOD PRESSURE: 58 MMHG | WEIGHT: 171.5 LBS | TEMPERATURE: 98.5 F | HEIGHT: 69 IN | OXYGEN SATURATION: 99 % | BODY MASS INDEX: 25.4 KG/M2

## 2021-01-21 DIAGNOSIS — Z13.6 CARDIOVASCULAR SCREENING; LDL GOAL LESS THAN 160: ICD-10-CM

## 2021-01-21 DIAGNOSIS — F10.10 ETOH ABUSE: ICD-10-CM

## 2021-01-21 DIAGNOSIS — Z11.59 NEED FOR HEPATITIS C SCREENING TEST: ICD-10-CM

## 2021-01-21 DIAGNOSIS — I42.9 CARDIOMYOPATHY, UNSPECIFIED TYPE (H): ICD-10-CM

## 2021-01-21 DIAGNOSIS — N46.9 INFERTILITY MALE: ICD-10-CM

## 2021-01-21 DIAGNOSIS — Z00.00 ROUTINE GENERAL MEDICAL EXAMINATION AT A HEALTH CARE FACILITY: Primary | ICD-10-CM

## 2021-01-21 LAB
ALBUMIN SERPL-MCNC: 4.5 G/DL (ref 3.4–5)
ALP SERPL-CCNC: 51 U/L (ref 40–150)
ALT SERPL W P-5'-P-CCNC: 65 U/L (ref 0–70)
AST SERPL W P-5'-P-CCNC: 30 U/L (ref 0–45)
BILIRUB DIRECT SERPL-MCNC: <0.1 MG/DL (ref 0–0.2)
BILIRUB SERPL-MCNC: 0.3 MG/DL (ref 0.2–1.3)
CHOLEST SERPL-MCNC: 211 MG/DL
HCV AB SERPL QL IA: NONREACTIVE
HDLC SERPL-MCNC: 57 MG/DL
LDLC SERPL CALC-MCNC: 130 MG/DL
NONHDLC SERPL-MCNC: 154 MG/DL
PROT SERPL-MCNC: 8.2 G/DL (ref 6.8–8.8)
TRIGL SERPL-MCNC: 118 MG/DL

## 2021-01-21 PROCEDURE — 36415 COLL VENOUS BLD VENIPUNCTURE: CPT | Performed by: FAMILY MEDICINE

## 2021-01-21 PROCEDURE — 80076 HEPATIC FUNCTION PANEL: CPT | Performed by: FAMILY MEDICINE

## 2021-01-21 PROCEDURE — 86803 HEPATITIS C AB TEST: CPT | Performed by: FAMILY MEDICINE

## 2021-01-21 PROCEDURE — 80061 LIPID PANEL: CPT | Performed by: FAMILY MEDICINE

## 2021-01-21 PROCEDURE — 99396 PREV VISIT EST AGE 40-64: CPT | Performed by: FAMILY MEDICINE

## 2021-01-21 SDOH — HEALTH STABILITY: MENTAL HEALTH: HOW OFTEN DO YOU HAVE A DRINK CONTAINING ALCOHOL?: 4 OR MORE TIMES A WEEK

## 2021-01-21 SDOH — HEALTH STABILITY: MENTAL HEALTH: HOW MANY STANDARD DRINKS CONTAINING ALCOHOL DO YOU HAVE ON A TYPICAL DAY?: NOT ASKED

## 2021-01-21 SDOH — HEALTH STABILITY: MENTAL HEALTH: HOW OFTEN DO YOU HAVE 6 OR MORE DRINKS ON ONE OCCASION?: NOT ASKED

## 2021-01-21 ASSESSMENT — MIFFLIN-ST. JEOR: SCORE: 1673.53

## 2021-01-21 ASSESSMENT — PAIN SCALES - GENERAL: PAINLEVEL: NO PAIN (0)

## 2021-01-21 NOTE — PROGRESS NOTES
SUBJECTIVE:   CC: John Nichols is an 40 year old male who presents for preventative health visit.       Patient has been advised of split billing requirements and indicates understanding: Yes  Healthy Habits:     Getting at least 3 servings of Calcium per day:  Yes    Bi-annual eye exam:  NO    Dental care twice a year:  Yes    Sleep apnea or symptoms of sleep apnea:  None    Diet:  Regular (no restrictions) and Carbohydrate counting    Frequency of exercise:  2-3 days/week    Duration of exercise:  Greater than 60 minutes    Taking medications regularly:  Yes    Medication side effects:  Not applicable    PHQ-2 Total Score: 0    Additional concerns today:  No              Today's PHQ-2 Score:   PHQ-2 ( 1999 [employer]) 1/21/2021   Q1: Little interest or pleasure in doing things 0   Q2: Feeling down, depressed or hopeless 0   PHQ-2 Score 0   Q1: Little interest or pleasure in doing things -   Q2: Feeling down, depressed or hopeless -   PHQ-2 Score -       Abuse: Current or Past(Physical, Sexual or Emotional)- No  Do you feel safe in your environment? Yes        Social History     Tobacco Use     Smoking status: Former Smoker     Smokeless tobacco: Never Used     Tobacco comment: Quit yrs ago-2002   Substance Use Topics     Alcohol use: Yes     Frequency: 4 or more times a week     Comment: 1 beer per day; few on Friday/Saturday night     If you drink alcohol do you typically have >3 drinks per day or >7 drinks per week? Yes      Alcohol Use 1/20/2021   Prescreen: >3 drinks/day or >7 drinks/week? Yes   Prescreen: >3 drinks/day or >7 drinks/week? -   AUDIT SCORE  11     AUDIT - Alcohol Use Disorders Identification Test - Reproduced from the World Health Organization Audit 2001 (Second Edition) 1/20/2021   1.  How often do you have a drink containing alcohol? 4 or more times a week   2.  How many drinks containing alcohol do you have on a typical day when you are drinking? 7 to 9   3.  How often do you have five or  more drinks on one occasion? Daily or almost daily   4.  How often during the last year have you found that you were not able to stop drinking once you had started? Never   5.  How often during the last year have you failed to do what was normally expected of you because of drinking? Never   6.  How often during the last year have you needed a first drink in the morning to get yourself going after a heavy drinking session? Never   7.  How often during the last year have you had a feeling of guilt or remorse after drinking? Never   8.  How often during the last year have you been unable to remember what happened the night before because of your drinking? Never   9.  Have you or someone else been injured because of your drinking? No   10. Has a relative, friend, doctor or other health care worker been concerned about your drinking or suggested you cut down? No   TOTAL SCORE 11       Last PSA: No results found for: PSA    Reviewed orders with patient. Reviewed health maintenance and updated orders accordingly - Yes  BP Readings from Last 3 Encounters:   01/21/21 98/58   10/07/20 112/64   10/05/20 120/79    Wt Readings from Last 3 Encounters:   01/21/21 77.8 kg (171 lb 8 oz)   10/07/20 78.5 kg (173 lb 1.6 oz)   10/05/20 79.6 kg (175 lb 8 oz)                  Patient Active Problem List   Diagnosis     Dermatophytosis of nail     Dysrhythmia, cardiac     Onychomycosis     Past Surgical History:   Procedure Laterality Date     [facility] NONSPECIFIC PROCEDURE      Cyst removal on back       Social History     Tobacco Use     Smoking status: Former Smoker     Smokeless tobacco: Never Used     Tobacco comment: Quit yrs ago-2002   Substance Use Topics     Alcohol use: Yes     Frequency: 4 or more times a week     Comment: 1 beer per day; few on Friday/Saturday night     Family History   Problem Relation Age of Onset     Diabetes Mother      Hypertension Father      Alcohol/Drug Father      Lipids Father          No current  "outpatient medications on file.       Reviewed and updated as needed this visit by clinical staff  Tobacco  Allergies  Meds   Med Hx  Surg Hx  Fam Hx  Soc Hx        Reviewed and updated as needed this visit by Provider                    CONSTITUTIONAL: NEGATIVE for fever, chills, change in weight  INTEGUMENTARY/SKIN: NEGATIVE for worrisome rashes, moles or lesions  EYES: NEGATIVE for vision changes or irritation  ENT: NEGATIVE for ear, mouth and throat problems  RESP: NEGATIVE for significant cough or SOB  CV: irregular heart beat when I had the patient elaborate on his irregular heartbeat he stated he was diagnosed in 2010 but he was drinking a lot of Monster energy drinks at the time.  States that the cardiologist at that time told him not to run or work out because he might drop over.  When I did review the cardiology note from 2010 finds out he has a cardiomyopathy with an ejection fraction of 38 to 40% this was felt to be alcohol induced as he was drinking quite heavily at the time he did not mention alcohol use at this time.  Is not had follow-up since that time  GI: NEGATIVE for nausea, abdominal pain, heartburn, or change in bowel habits   male: negative for dysuria, hematuria, decreased urinary stream, erectile dysfunction, urethral discharge  MUSCULOSKELETAL: NEGATIVE for significant arthralgias or myalgia  NEURO: NEGATIVE for weakness, dizziness or paresthesias  PSYCHIATRIC: NEGATIVE for changes in mood or affect    OBJECTIVE:   BP 98/58   Pulse 84   Temp 98.5  F (36.9  C) (Tympanic)   Resp 14   Ht 1.745 m (5' 8.7\")   Wt 77.8 kg (171 lb 8 oz)   SpO2 99%   BMI 25.55 kg/m      Physical Exam  GENERAL: healthy, alert and no distress  EYES: Eyes grossly normal to inspection, PERRL and conjunctivae and sclerae normal  HENT: ear canals and TM's normal, nose and mouth without ulcers or lesions  NECK: no adenopathy, no asymmetry, masses, or scars and thyroid normal to palpation  RESP: lungs clear " to auscultation - no rales, rhonchi or wheezes  CV: regular rate and rhythm, normal S1 S2, no S3 or S4, no murmur, click or rub, no peripheral edema and peripheral pulses strong  ABDOMEN: soft, nontender, no hepatosplenomegaly, no masses and bowel sounds normal  MS: no gross musculoskeletal defects noted, no edema  SKIN: no suspicious lesions or rashes  NEURO: Normal strength and tone, mentation intact and speech normal  BACK: no CVA tenderness, no paralumbar tenderness  PSYCH: mentation appears normal, affect normal/bright    Diagnostic Test Results:  Labs reviewed in Epic  Results for orders placed or performed in visit on 01/21/21   Lipid panel reflex to direct LDL Fasting     Status: Abnormal   Result Value Ref Range    Cholesterol 211 (H) <200 mg/dL    Triglycerides 118 <150 mg/dL    HDL Cholesterol 57 >39 mg/dL    LDL Cholesterol Calculated 130 (H) <100 mg/dL    Non HDL Cholesterol 154 (H) <130 mg/dL   Hepatitis C Screen Reflex to HCV RNA Quant and Genotype     Status: None   Result Value Ref Range    Hepatitis C Antibody Nonreactive NR^Nonreactive   Hepatic panel (Albumin, ALT, AST, Bili, Alk Phos, TP)     Status: None   Result Value Ref Range    Bilirubin Direct <0.1 0.0 - 0.2 mg/dL    Bilirubin Total 0.3 0.2 - 1.3 mg/dL    Albumin 4.5 3.4 - 5.0 g/dL    Protein Total 8.2 6.8 - 8.8 g/dL    Alkaline Phosphatase 51 40 - 150 U/L    ALT 65 0 - 70 U/L    AST 30 0 - 45 U/L       ASSESSMENT/PLAN:   1. Routine general medical examination at a health care facility      2. Need for hepatitis C screening test    - Hepatitis C Screen Reflex to HCV RNA Quant and Genotype    3. CARDIOVASCULAR SCREENING; LDL GOAL LESS THAN 160    - Lipid panel reflex to direct LDL Fasting    4. Cardiomyopathy, unspecified type (H)    - Echocardiogram Complete; Future  - CARDIOLOGY EVAL ADULT REFERRAL; Future    5. Infertility male    - UROLOGY ADULT REFERRAL; Future        - Hepatic panel (Albumin, ALT, AST, Bili, Alk Phos, TP)    Patient  "has been advised of split billing requirements and indicates understanding: Yes  COUNSELING:   Reviewed preventive health counseling, as reflected in patient instructions       Regular exercise       Healthy diet/nutrition    Estimated body mass index is 25.55 kg/m  as calculated from the following:    Height as of this encounter: 1.745 m (5' 8.7\").    Weight as of this encounter: 77.8 kg (171 lb 8 oz).         He reports that he has quit smoking. He has never used smokeless tobacco.      Counseling Resources:  ATP IV Guidelines  Pooled Cohorts Equation Calculator  FRAX Risk Assessment  ICSI Preventive Guidelines  Dietary Guidelines for Americans, 2010  USDA's MyPlate  ASA Prophylaxis  Lung CA Screening    Ernesto Cummings MD  [facility]  "

## 2021-01-22 ENCOUNTER — HOSPITAL ENCOUNTER (OUTPATIENT)
Dept: CARDIOLOGY | Facility: CLINIC | Age: 41
Discharge: HOME | End: 2021-01-22
Attending: FAMILY MEDICINE | Admitting: FAMILY MEDICINE
Payer: COMMERCIAL

## 2021-01-22 DIAGNOSIS — I42.9 CARDIOMYOPATHY, UNSPECIFIED TYPE (H): ICD-10-CM

## 2021-01-22 PROCEDURE — 93306 TTE W/DOPPLER COMPLETE: CPT | Mod: 26 | Performed by: INTERNAL MEDICINE

## 2021-01-22 PROCEDURE — 93306 TTE W/DOPPLER COMPLETE: CPT

## 2021-02-15 ENCOUNTER — HOSPITAL ENCOUNTER (OUTPATIENT)
Dept: CARDIOLOGY | Facility: CLINIC | Age: 41
Discharge: HOME | End: 2021-02-15
Attending: INTERNAL MEDICINE | Admitting: INTERNAL MEDICINE
Payer: COMMERCIAL

## 2021-02-15 ENCOUNTER — OFFICE VISIT (OUTPATIENT)
Dept: CARDIOLOGY | Facility: CLINIC | Age: 41
End: 2021-02-15
Payer: COMMERCIAL

## 2021-02-15 VITALS
BODY MASS INDEX: 25.84 KG/M2 | HEART RATE: 64 BPM | HEIGHT: 69 IN | OXYGEN SATURATION: 97 % | WEIGHT: 174.5 LBS | DIASTOLIC BLOOD PRESSURE: 72 MMHG | SYSTOLIC BLOOD PRESSURE: 124 MMHG

## 2021-02-15 DIAGNOSIS — I42.9 CARDIOMYOPATHY, UNSPECIFIED TYPE (H): Primary | ICD-10-CM

## 2021-02-15 DIAGNOSIS — R00.2 PALPITATIONS: ICD-10-CM

## 2021-02-15 PROCEDURE — 93005 ELECTROCARDIOGRAM TRACING: CPT | Performed by: REHABILITATION PRACTITIONER

## 2021-02-15 PROCEDURE — 93010 ELECTROCARDIOGRAM REPORT: CPT | Performed by: INTERNAL MEDICINE

## 2021-02-15 PROCEDURE — 99204 OFFICE O/P NEW MOD 45 MIN: CPT | Performed by: INTERNAL MEDICINE

## 2021-02-15 RX ORDER — LISINOPRIL 5 MG/1
5 TABLET ORAL DAILY
Qty: 90 TABLET | Refills: 3 | Status: SHIPPED | OUTPATIENT
Start: 2021-02-15 | End: 2021-12-29

## 2021-02-15 RX ORDER — METOPROLOL SUCCINATE 25 MG/1
25 TABLET, EXTENDED RELEASE ORAL DAILY
Qty: 90 TABLET | Refills: 3 | Status: SHIPPED | OUTPATIENT
Start: 2021-02-15 | End: 2021-12-29

## 2021-02-15 ASSESSMENT — MIFFLIN-ST. JEOR: SCORE: 1687.14

## 2021-02-15 NOTE — PROGRESS NOTES
HISTORY:    John Nichols is a pleasant 40-year-old male who was discovered in 2010 to have cardiomyopathy.  This was followed after he presented with a sense of palpitations.  It turned out he was also using a lot of caffeine at the time as well as very heavy alcohol use.  He was seen by Dr. Tj Thomas who felt that the cardiomyopathy was most likely secondary to heavy alcohol use.  At the time he convinced him to cut back on his caffeine and to cut back considerably on his alcohol use.  He was successful at doing so but then never followed up.    Today Herson reports that he continues to do very well.  He drinks alcohol on a regular basis.  His wife and he work different shifts and about 5 days out of every 2-week.  They have the same evening off and on those evenings they typically drink 10-12 beers each.  Other days he tends not to drink much at all and instead exercises heavily, typically running about 5 miles on these alternate days.  He previously ran marathons but is not doing so anymore.    Herson feels fit and has no real cardiovascular complaints.  He states that occasionally he has a sense of palpitations with a fast heart rate lasting just a few seconds.  This really does not bother him and causes no other symptoms, and has never lasted for a full minute.  He denies PND/orthopnea, exertional chest pain, syncope or near syncope, strokelike symptoms, peripheral edema, or claudication.    An echocardiogram was recently repeated which demonstrated an ejection fraction of 40 to 45% with normal size atria and no significant valvular disease.  This was unchanged from his last echo done over 10 years ago.    I also reviewed the most recent lipid profile done in January and compared it to his last lipid profile done in 2017.  In 2017 his triglycerides were very high but his cholesterol was essentially identical.  He states that he has given up pizza and fast food but still eats a fair amount of high-fat meat  and other products.    Cardiac risk factors are mostly negative.  The patient has an excellent blood pressure, cholesterol which is not significantly elevated, and a history of smoking only briefly, quitting in 2002.  He is not diabetic.  There is no family history of coronary disease although multiple people have had strokes and there is extensive history of dementia in the family.      ASSESSMENT/PLAN:    1.  Cardiomyopathy.  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  I explained that he is likely to achieve improved heart function and reduce chance of life-threatening arrhythmia with the use of an ACE inhibitor and beta-blocker and he is willing to initiate these.  I have started lisinopril 5 mg daily and Toprol-XL 25 mg daily.  I warned him of potential side effects.  We will check metabolic profile in 2 weeks.  2.  Cholesterol.  I reviewed his cholesterol levels with him.  I do not think he requires treatment of his modest cholesterol elevation because his calculated cardiovascular risk in the next 10 years is just 1%.  3.  Alcohol use.  I encouraged him to cut back on his alcohol and discussed other potential toxicities of this product.  He expresses an interest in cutting back substantially.  4.  Palpitations.  The patient describes a brief, infrequent, self-limited episodes of tachycardia that are not particularly worrisome for him and are stable.  I do not think we need to pursue these further and I reminded him that initiation of metoprolol may suppress these.  If they become worse we can always do some type of event monitor.    Thank you for inviting me to participate in your patient's care.  I will plan a 6-month follow-up visit at which time I will plan on titrating his medications further.  Please do not hesitate  to call if I can be of further assistance.    Chart documentation was completed, in part, with [employer] voice-recognition software. Even though reviewed, some grammatical, spelling, and word errors may remain.       Orders Placed This Encounter   Procedures     Basic metabolic panel     Follow-Up with Cardiologist     EKG 12-LEAD CLINIC READ     Orders Placed This Encounter   Medications     lisinopril (ZESTRIL) 5 MG tablet     Sig: Take 1 tablet (5 mg) by mouth daily     Dispense:  90 tablet     Refill:  3     metoprolol succinate ER (TOPROL XL) 25 MG 24 hr tablet     Sig: Take 1 tablet (25 mg) by mouth daily     Dispense:  90 tablet     Refill:  3     There are no discontinued medications.    10 year ASCVD risk: The 10-year ASCVD risk score (Paris REN Jr., et al., 2013) is: 1%    Values used to calculate the score:      Age: 40 years      Sex: Male      Is Non- : No      Diabetic: No      Tobacco smoker: No      Systolic Blood Pressure: 124 mmHg      Is BP treated: No      HDL Cholesterol: 57 mg/dL      Total Cholesterol: 211 mg/dL    Encounter Diagnoses   Name Primary?     Cardiomyopathy, unspecified type (H)      Dysrhythmia, cardiac Yes       CURRENT MEDICATIONS:  Current Outpatient Medications   Medication Sig Dispense Refill     lisinopril (ZESTRIL) 5 MG tablet Take 1 tablet (5 mg) by mouth daily 90 tablet 3     metoprolol succinate ER (TOPROL XL) 25 MG 24 hr tablet Take 1 tablet (25 mg) by mouth daily 90 tablet 3       ALLERGIES     Allergies   Allergen Reactions     No Known Drug Allergies        PAST MEDICAL HISTORY:  Past Medical History:   Diagnosis Date     NO ACTIVE PROBLEMS        PAST SURGICAL HISTORY:  Past Surgical History:   Procedure Laterality Date     ZZC NONSPECIFIC PROCEDURE      Cyst removal on back       FAMILY HISTORY:  Family History   Problem Relation Age of Onset     Diabetes Mother      Hypertension Father      Alcohol/Drug Father      Lipids Father        SOCIAL  HISTORY:  Social History     Socioeconomic History     Marital status:      Spouse name: Not on file     Number of children: Not on file     Years of education: Not on file     Highest education level: Not on file   Occupational History     Occupation:    Social Needs     Financial resource strain: Not on file     Food insecurity     Worry: Not on file     Inability: Not on file     Transportation needs     Medical: Not on file     Non-medical: Not on file   Tobacco Use     Smoking status: Former Smoker     Smokeless tobacco: Never Used     Tobacco comment: Quit yrs ago-2002   Substance and Sexual Activity     Alcohol use: Yes     Frequency: 4 or more times a week     Comment: 1 beer per day; few on Friday/Saturday night     Drug use: No     Sexual activity: Yes     Partners: Female   Lifestyle     Physical activity     Days per week: Not on file     Minutes per session: Not on file     Stress: Not on file   Relationships     Social connections     Talks on phone: Not on file     Gets together: Not on file     Attends Restorationist service: Not on file     Active member of club or organization: Not on file     Attends meetings of clubs or organizations: Not on file     Relationship status: Not on file     Intimate partner violence     Fear of current or ex partner: Not on file     Emotionally abused: Not on file     Physically abused: Not on file     Forced sexual activity: Not on file   Other Topics Concern      Service No     Blood Transfusions No     Caffeine Concern Yes     Comment: affects irregular heart beat     Occupational Exposure Yes     Comment: noise and heat      Hobby Hazards No     Sleep Concern No     Stress Concern No     Weight Concern No     Special Diet No     Back Care No     Exercise Yes     Comment: occ     Bike Helmet No     Seat Belt Yes     Self-Exams Yes     Parent/sibling w/ CABG, MI or angioplasty before 65F 55M? Not Asked   Social History Narrative     Not on file  "      Review of Systems:  Skin:  Negative     Eyes:  Negative    ENT:  Negative    Respiratory:  Negative    Cardiovascular:  Negative for;palpitations;chest pain;edema;lightheadedness;dizziness    Gastroenterology: Negative    Genitourinary:  Negative    Musculoskeletal:  Negative    Neurologic:  Negative    Psychiatric:  Negative    Heme/Lymph/Imm:  Negative    Endocrine:  Negative      Physical Exam:  Vitals: /72 (BP Location: Right arm, Patient Position: Fowlers, Cuff Size: Adult Regular)   Pulse 64   Ht 1.745 m (5' 8.7\")   Wt 79.2 kg (174 lb 8 oz)   SpO2 97%   BMI 25.99 kg/m      Constitutional:  cooperative, alert and oriented, well developed, well nourished, in no acute distress   Fit in appearance    Skin:  warm and dry to the touch, no apparent skin lesions or masses noted        Head:  normocephalic        Eyes:  sclera white;no nystagmus;no xanthalasma        ENT:  no pallor or cyanosis   masked    Neck:  carotid pulses are full and equal bilaterally, JVP normal, no carotid bruit        Chest:  normal breath sounds, clear to auscultation, normal A-P diameter, normal symmetry, normal respiratory excursion, no use of accessory muscles        Cardiac: regular rhythm, normal S1/S2, no S3 or S4, apical impulse not displaced, no murmurs, gallops or rubs                  Abdomen:  abdomen soft;BS normoactive        Vascular: pulses full and equal                                      Extremities and Back:  no edema        Neurological:  no gross motor deficits          Recent Lab Results:  LIPID RESULTS:  Lab Results   Component Value Date    CHOL 211 (H) 01/21/2021    HDL 57 01/21/2021     (H) 01/21/2021    TRIG 118 01/21/2021    CHOLHDLRATIO 3.0 09/04/2012       LIVER ENZYME RESULTS:  Lab Results   Component Value Date    AST 30 01/21/2021    ALT 65 01/21/2021       CBC RESULTS:  Lab Results   Component Value Date    WBC 6.0 11/09/2010    RBC 4.79 11/09/2010    HGB 13.3 11/09/2010    HCT 42.1 " 11/09/2010    MCV 88 11/09/2010    MCH 27.8 11/09/2010    MCHC 31.6 11/09/2010    RDW 13.5 11/09/2010     11/09/2010       BMP RESULTS:  Lab Results   Component Value Date     11/09/2010    POTASSIUM 4.6 11/09/2010    CHLORIDE 103 11/09/2010    CO2 32 11/09/2010    ANIONGAP 7.8 11/09/2010    GLC 89 11/09/2017    BUN 23 11/09/2010    CR 1.22 11/09/2010    GFRESTIMATED 70 11/09/2010    GFRESTBLACK 84 11/09/2010    KERWIN 9.7 11/09/2010        A1C RESULTS:  No results found for: A1C    INR RESULTS:  No results found for: INR      Thony Mcfadden MD, FACC    CC  Ernesto Cummings MD  [address] [place] [address]

## 2021-02-15 NOTE — LETTER
2/15/2021    Edwige Avalos, APRN CNP  [address] [place] [address]    RE: John Nichols       Dear Colleague,    I had the pleasure of seeing John Nichols in the [facility].    HISTORY:    John Nichols is a pleasant 40-year-old male who was discovered in 2010 to have cardiomyopathy.  This was followed after he presented with a sense of palpitations.  It turned out he was also using a lot of caffeine at the time as well as very heavy alcohol use.  He was seen by Dr. Tj Thomas who felt that the cardiomyopathy was most likely secondary to heavy alcohol use.  At the time he convinced him to cut back on his caffeine and to cut back considerably on his alcohol use.  He was successful at doing so but then never followed up.    Today Herson reports that he continues to do very well.  He drinks alcohol on a regular basis.  His wife and he work different shifts and about 5 days out of every 2-week.  They have the same evening off and on those evenings they typically drink 10-12 beers each.  Other days he tends not to drink much at all and instead exercises heavily, typically running about 5 miles on these alternate days.  He previously ran marathons but is not doing so anymore.    Herson feels fit and has no real cardiovascular complaints.  He states that occasionally he has a sense of palpitations with a fast heart rate lasting just a few seconds.  This really does not bother him and causes no other symptoms, and has never lasted for a full minute.  He denies PND/orthopnea, exertional chest pain, syncope or near syncope, strokelike symptoms, peripheral edema, or claudication.    An echocardiogram was recently repeated which demonstrated an ejection fraction of 40 to 45% with normal size atria and no significant valvular disease.  This was unchanged from his last echo done over 10 years ago.    I also reviewed the most recent lipid profile  done in January and compared it to his last lipid profile done in 2017.  In 2017 his triglycerides were very high but his cholesterol was essentially identical.  He states that he has given up pizza and fast food but still eats a fair amount of high-fat meat and other products.    Cardiac risk factors are mostly negative.  The patient has an excellent blood pressure, cholesterol which is not significantly elevated, and a history of smoking only briefly, quitting in 2002.  He is not diabetic.  There is no family history of coronary disease although multiple people have had strokes and there is extensive history of dementia in the family.      ASSESSMENT/PLAN:    1.  Cardiomyopathy.  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  I explained that he is likely to achieve improved heart function and reduce chance of life-threatening arrhythmia with the use of an ACE inhibitor and beta-blocker and he is willing to initiate these.  I have started lisinopril 5 mg daily and Toprol-XL 25 mg daily.  I warned him of potential side effects.  We will check metabolic profile in 2 weeks.  2.  Cholesterol.  I reviewed his cholesterol levels with him.  I do not think he requires treatment of his modest cholesterol elevation because his calculated cardiovascular risk in the next 10 years is just 1%.  3.  Alcohol use.  I encouraged him to cut back on his alcohol and discussed other potential toxicities of this product.  He expresses an interest in cutting back substantially.  4.  Palpitations.  The patient describes a brief, infrequent, self-limited episodes of tachycardia that are not particularly worrisome for him and are stable.  I do not think we need to pursue these further and I reminded him that initiation of metoprolol may suppress these.  If  they become worse we can always do some type of event monitor.    Thank you for inviting me to participate in your patient's care.  I will plan a 6-month follow-up visit at which time I will plan on titrating his medications further.  Please do not hesitate to call if I can be of further assistance.    Chart documentation was completed, in part, with [employer] voice-recognition software. Even though reviewed, some grammatical, spelling, and word errors may remain.       Orders Placed This Encounter   Procedures     Basic metabolic panel     Follow-Up with Cardiologist     EKG 12-LEAD CLINIC READ     Orders Placed This Encounter   Medications     lisinopril (ZESTRIL) 5 MG tablet     Sig: Take 1 tablet (5 mg) by mouth daily     Dispense:  90 tablet     Refill:  3     metoprolol succinate ER (TOPROL XL) 25 MG 24 hr tablet     Sig: Take 1 tablet (25 mg) by mouth daily     Dispense:  90 tablet     Refill:  3     There are no discontinued medications.    10 year ASCVD risk: The 10-year ASCVD risk score (Catorose REN Jr., et al., 2013) is: 1%    Values used to calculate the score:      Age: 40 years      Sex: Male      Is Non- : No      Diabetic: No      Tobacco smoker: No      Systolic Blood Pressure: 124 mmHg      Is BP treated: No      HDL Cholesterol: 57 mg/dL      Total Cholesterol: 211 mg/dL    Encounter Diagnoses   Name Primary?     Cardiomyopathy, unspecified type (H)      Dysrhythmia, cardiac Yes       CURRENT MEDICATIONS:  Current Outpatient Medications   Medication Sig Dispense Refill     lisinopril (ZESTRIL) 5 MG tablet Take 1 tablet (5 mg) by mouth daily 90 tablet 3     metoprolol succinate ER (TOPROL XL) 25 MG 24 hr tablet Take 1 tablet (25 mg) by mouth daily 90 tablet 3       ALLERGIES     Allergies   Allergen Reactions     No Known Drug Allergies        PAST MEDICAL HISTORY:  Past Medical History:   Diagnosis Date     NO ACTIVE PROBLEMS        PAST SURGICAL HISTORY:  Past Surgical History:    Procedure Laterality Date     ZZC NONSPECIFIC PROCEDURE      Cyst removal on back       FAMILY HISTORY:  Family History   Problem Relation Age of Onset     Diabetes Mother      Hypertension Father      Alcohol/Drug Father      Lipids Father        SOCIAL HISTORY:  Social History     Socioeconomic History     Marital status:      Spouse name: Not on file     Number of children: Not on file     Years of education: Not on file     Highest education level: Not on file   Occupational History     Occupation:    Social Needs     Financial resource strain: Not on file     Food insecurity     Worry: Not on file     Inability: Not on file     Transportation needs     Medical: Not on file     Non-medical: Not on file   Tobacco Use     Smoking status: Former Smoker     Smokeless tobacco: Never Used     Tobacco comment: Quit yrs ago-2002   Substance and Sexual Activity     Alcohol use: Yes     Frequency: 4 or more times a week     Comment: 1 beer per day; few on Friday/Saturday night     Drug use: No     Sexual activity: Yes     Partners: Female   Lifestyle     Physical activity     Days per week: Not on file     Minutes per session: Not on file     Stress: Not on file   Relationships     Social connections     Talks on phone: Not on file     Gets together: Not on file     Attends [employer] service: Not on file     Active member of club or organization: Not on file     Attends meetings of clubs or organizations: Not on file     Relationship status: Not on file     Intimate partner violence     Fear of current or ex partner: Not on file     Emotionally abused: Not on file     Physically abused: Not on file     Forced sexual activity: Not on file   Other Topics Concern      Service No     Blood Transfusions No     Caffeine Concern Yes     Comment: affects irregular heart beat     Occupational Exposure Yes     Comment: noise and heat      Hobby Hazards No     Sleep Concern No     Stress Concern No      "Weight Concern No     Special Diet No     Back Care No     Exercise Yes     Comment: occ     Bike Helmet No     Seat Belt Yes     Self-Exams Yes     Parent/sibling w/ CABG, MI or angioplasty before 65F 55M? Not Asked   Social History Narrative     Not on file       Review of Systems:  Skin:  Negative     Eyes:  Negative    ENT:  Negative    Respiratory:  Negative    Cardiovascular:  Negative for;palpitations;chest pain;edema;lightheadedness;dizziness    Gastroenterology: Negative    Genitourinary:  Negative    Musculoskeletal:  Negative    Neurologic:  Negative    Psychiatric:  Negative    Heme/Lymph/Imm:  Negative    Endocrine:  Negative      Physical Exam:  Vitals: /72 (BP Location: Right arm, Patient Position: Fowlers, Cuff Size: Adult Regular)   Pulse 64   Ht 1.745 m (5' 8.7\")   Wt 79.2 kg (174 lb 8 oz)   SpO2 97%   BMI 25.99 kg/m      Constitutional:  cooperative, alert and oriented, well developed, well nourished, in no acute distress   Fit in appearance    Skin:  warm and dry to the touch, no apparent skin lesions or masses noted        Head:  normocephalic        Eyes:  sclera white;no nystagmus;no xanthalasma        ENT:  no pallor or cyanosis   masked    Neck:  carotid pulses are full and equal bilaterally, JVP normal, no carotid bruit        Chest:  normal breath sounds, clear to auscultation, normal A-P diameter, normal symmetry, normal respiratory excursion, no use of accessory muscles        Cardiac: regular rhythm, normal S1/S2, no S3 or S4, apical impulse not displaced, no murmurs, gallops or rubs                  Abdomen:  abdomen soft;BS normoactive        Vascular: pulses full and equal                                      Extremities and Back:  no edema        Neurological:  no gross motor deficits          Recent Lab Results:  LIPID RESULTS:  Lab Results   Component Value Date    CHOL 211 (H) 01/21/2021    HDL 57 01/21/2021     (H) 01/21/2021    TRIG 118 01/21/2021    " CHOLHDLRATIO 3.0 09/04/2012       LIVER ENZYME RESULTS:  Lab Results   Component Value Date    AST 30 01/21/2021    ALT 65 01/21/2021       CBC RESULTS:  Lab Results   Component Value Date    WBC 6.0 11/09/2010    RBC 4.79 11/09/2010    HGB 13.3 11/09/2010    HCT 42.1 11/09/2010    MCV 88 11/09/2010    MCH 27.8 11/09/2010    MCHC 31.6 11/09/2010    RDW 13.5 11/09/2010     11/09/2010       BMP RESULTS:  Lab Results   Component Value Date     11/09/2010    POTASSIUM 4.6 11/09/2010    CHLORIDE 103 11/09/2010    CO2 32 11/09/2010    ANIONGAP 7.8 11/09/2010    GLC 89 11/09/2017    BUN 23 11/09/2010    CR 1.22 11/09/2010    GFRESTIMATED 70 11/09/2010    GFRESTBLACK 84 11/09/2010    KERWIN 9.7 11/09/2010        A1C RESULTS:  No results found for: A1C    INR RESULTS:  No results found for: INR        Thank you for allowing me to participate in the care of your patient.    Sincerely,     Thony Mcfadden MD     [facility]

## 2021-05-15 ENCOUNTER — HOSPITAL ENCOUNTER (EMERGENCY)
Facility: CLINIC | Age: 41
Discharge: HOME | End: 2021-05-15
Attending: NURSE PRACTITIONER | Admitting: NURSE PRACTITIONER
Payer: COMMERCIAL

## 2021-05-15 VITALS
HEIGHT: 69 IN | OXYGEN SATURATION: 99 % | TEMPERATURE: 98.4 F | DIASTOLIC BLOOD PRESSURE: 81 MMHG | RESPIRATION RATE: 18 BRPM | BODY MASS INDEX: 23.85 KG/M2 | WEIGHT: 161 LBS | SYSTOLIC BLOOD PRESSURE: 126 MMHG | HEART RATE: 95 BPM

## 2021-05-15 DIAGNOSIS — R63.8 DECREASED ORAL INTAKE: ICD-10-CM

## 2021-05-15 DIAGNOSIS — R00.0 SINUS TACHYCARDIA: ICD-10-CM

## 2021-05-15 PROCEDURE — 99282 EMERGENCY DEPT VISIT SF MDM: CPT | Performed by: NURSE PRACTITIONER

## 2021-05-15 ASSESSMENT — ENCOUNTER SYMPTOMS
LIGHT-HEADEDNESS: 0
COUGH: 0
WHEEZING: 0
CONSTITUTIONAL NEGATIVE: 1
DIZZINESS: 0
SLEEP DISTURBANCE: 0
CHEST TIGHTNESS: 0
DIFFICULTY URINATING: 0
EYE REDNESS: 0
MYALGIAS: 1
HEMATOLOGIC/LYMPHATIC NEGATIVE: 1

## 2021-05-15 ASSESSMENT — MIFFLIN-ST. JEOR: SCORE: 1630.67

## 2021-05-16 NOTE — ED PROVIDER NOTES
Triage Note  1908 Pt states second covid vaccine Thursday, 'felt like complete crap on Friday, then was feeling better today ran a 5K. Phone/watch monitored pt o2 sats at 88%, has been more sob with exertion. Denies known fever or coughing. Pt states 'heart has been racing'.        History     Chief Complaint   Patient presents with     Shortness of Breath     HPI  John Nichols is a 40 year old male who is to the emergency department with noting his oxygen saturation was 88% with a heart rate elevated on his apple phone after walking up his stairs and was feeling short of breath earlier today.  Patient reports he had his second dose of [place] vaccine this Thursday on 5/13.  He mentions he did go out late Friday and he had some beers and then on Saturday or this morning he woke up and ran a 5K race.  He admits to not drinking plenty of fluids.  He reports he has only had 16 ounces of water today.  He reports he felt poorly on Friday post the vaccine and continues to not feel completely well today.  He currently denies fever, shortness of breath, chest pain or tightness, nausea and vomiting.  On arrival to the emergency department he was hemodynamically stable and not hypoxic.      PCP: Miki Moreno     Allergies:  Allergies   Allergen Reactions     No Known Drug Allergies        Problem List:    Patient Active Problem List    Diagnosis Date Noted     Onychomycosis 02/23/2018     Priority: Medium     Dysrhythmia, cardiac 10/26/2010     Priority: Medium     Dermatophytosis of nail 05/14/2007     Priority: Medium        Past Medical History:    Past Medical History:   Diagnosis Date     NO ACTIVE PROBLEMS        Past Surgical History:    Past Surgical History:   Procedure Laterality Date     [facility] NONSPECIFIC PROCEDURE      Cyst removal on back       Family History:    Family History   Problem Relation Age of Onset     Diabetes Mother      Hypertension Father      Alcohol/Drug Father      Lipids  "Father        Social History:  Marital Status:   [2]  Social History     Tobacco Use     Smoking status: Former Smoker     Smokeless tobacco: Never Used     Tobacco comment: Quit yrs ago-2002   Substance Use Topics     Alcohol use: Yes     Frequency: 4 or more times a week     Comment: 1 beer per day; few on Friday/Saturday night     Drug use: No        Medications:    lisinopril (ZESTRIL) 5 MG tablet  metoprolol succinate ER (TOPROL XL) 25 MG 24 hr tablet          Review of Systems   Constitutional: Negative.    HENT: Negative.    Eyes: Negative for redness.   Respiratory: Negative for cough, chest tightness and wheezing.    Cardiovascular: Negative for chest pain and leg swelling.   Genitourinary: Negative for difficulty urinating.   Musculoskeletal: Positive for myalgias.   Skin: Negative.    Neurological: Negative for dizziness and light-headedness.   Hematological: Negative.    Psychiatric/Behavioral: Negative for sleep disturbance.       Physical Exam   BP: 126/81  Pulse: 95  Temp: 98.4  F (36.9  C)  Resp: 18  Height: 175.3 cm (5' 9\")  Weight: 73 kg (161 lb)  SpO2: 99 %      Physical Exam  Vitals signs and nursing note reviewed.   Constitutional:       General: He is not in acute distress.     Appearance: He is well-developed.   HENT:      Head: Normocephalic and atraumatic.   Cardiovascular:      Rate and Rhythm: Regular rhythm. Tachycardia present.      Pulses: Normal pulses.      Heart sounds: Normal heart sounds.   Pulmonary:      Effort: Pulmonary effort is normal.      Breath sounds: Normal breath sounds.   Musculoskeletal:      Right lower leg: He exhibits no tenderness. No edema.      Left lower leg: He exhibits no tenderness. No edema.   Skin:     General: Skin is warm and dry.      Capillary Refill: Capillary refill takes less than 2 seconds.   Neurological:      Mental Status: He is alert.   Psychiatric:         Mood and Affect: Mood normal.         ED Course  We had patient ambulate around " the unit with a pulse oximeter and patient's heart rate ranged from the 120s up to the mid 140s.  He did not feel short of breath, no chest tightness, no chest pain.  His oxygen sats maintained greater than 98% on room air.    1945 I reviewed with the patient in regards to his sinus tachycardia.  I discussed with the patient we can certainly start an IV obtain a D-dimer in addition to other baseline labs and give him IV fluids as I do suspect he is dehydrated or fluid depleted.  Patient verbalized he would prefer to go home and drink fluids instead.  I discussed with the patient I would then want him to drink at least 72 ounces of a mix between electrolyte replacement and water.  I discussed with the patient if he continues with an elevated heart rate throughout tomorrow or any worsening symptoms he is to return back to the emergency department for further eval.  Patient verbalized understanding and requests discharge to home with instructions on oral hydration.        Procedures    No results found for this or any previous visit (from the past 24 hour(s)).    Medications - No data to display    Assessments & Plan (with Medical Decision Making)     I have reviewed the nursing notes.    I have reviewed the findings, diagnosis, plan and need for follow up with the patient.      New Prescriptions    No medications on file       Final diagnoses:   Sinus tachycardia   Decreased oral intake       5/15/2021   [facility] EMERGENCY DEPT     Nkechi Graves APRN CNP  05/15/21 1956

## 2021-05-16 NOTE — ED TRIAGE NOTES
Pt states second covid vaccine Thursday, 'felt like complete crap on Friday, then was feeling better today ran a 5K. Phone/watch monitored pt o2 sats at 88%, has been more sob with exertion. Denies known fever or coughing. Pt states 'heart has been racing'.

## 2021-05-16 NOTE — DISCHARGE INSTRUCTIONS
I suspect dehydration as the cause of your sinus tachycardia or elevated heart rate.  I want you to drink at least 72 ounces of fluid this evening I want you to [employer] water in addition to electrolyte drinks preferably low sugar electrolyte supplemental drinks.    If you do not feel improved in the next 24 hours or if you have any worsening symptoms you are to return back to the emergency department for eval.

## 2021-09-18 ENCOUNTER — HEALTH MAINTENANCE LETTER (OUTPATIENT)
Age: 41
End: 2021-09-18

## 2021-12-29 ENCOUNTER — OFFICE VISIT (OUTPATIENT)
Dept: FAMILY MEDICINE | Facility: CLINIC | Age: 41
End: 2021-12-29
Payer: COMMERCIAL

## 2021-12-29 VITALS
HEART RATE: 98 BPM | BODY MASS INDEX: 25.55 KG/M2 | OXYGEN SATURATION: 100 % | SYSTOLIC BLOOD PRESSURE: 115 MMHG | TEMPERATURE: 97 F | WEIGHT: 173 LBS | DIASTOLIC BLOOD PRESSURE: 72 MMHG

## 2021-12-29 DIAGNOSIS — Z01.818 PREOP GENERAL PHYSICAL EXAM: Primary | ICD-10-CM

## 2021-12-29 DIAGNOSIS — I42.9 CARDIOMYOPATHY, UNSPECIFIED TYPE (H): ICD-10-CM

## 2021-12-29 DIAGNOSIS — Z98.52 HX OF VASECTOMY: ICD-10-CM

## 2021-12-29 DIAGNOSIS — Z11.52 ENCOUNTER FOR SCREENING FOR COVID-19: ICD-10-CM

## 2021-12-29 PROCEDURE — 99214 OFFICE O/P EST MOD 30 MIN: CPT | Performed by: PHYSICIAN ASSISTANT

## 2021-12-29 RX ORDER — LISINOPRIL 5 MG/1
5 TABLET ORAL DAILY
Qty: 90 TABLET | Refills: 1 | Status: SHIPPED | OUTPATIENT
Start: 2021-12-29 | End: 2022-04-25

## 2021-12-29 RX ORDER — METOPROLOL SUCCINATE 25 MG/1
25 TABLET, EXTENDED RELEASE ORAL DAILY
Qty: 90 TABLET | Refills: 1 | Status: SHIPPED | OUTPATIENT
Start: 2021-12-29 | End: 2022-03-16

## 2021-12-29 ASSESSMENT — PAIN SCALES - GENERAL: PAINLEVEL: NO PAIN (0)

## 2021-12-29 NOTE — PATIENT INSTRUCTIONS
Preparing for Your Surgery  Getting started  A nurse will call you to review your health history and instructions. They will give you an arrival time based on your scheduled surgery time. Please be ready to share:    Your doctor's clinic name and phone number    Your medical, surgical and anesthesia history    A list of allergies and sensitivities    A list of medicines, including herbal treatments and over-the-counter drugs    Whether the patient has a legal guardian (ask how to send us the papers in advance)  Please tell us if you're pregnant--or if there's any chance you might be pregnant. Some surgeries may injure a fetus (unborn baby), so they require a pregnancy test. Surgeries that are safe for a fetus don't always need a test, and you can choose whether to have one.   If you have a child who's having surgery, please ask for a copy of Preparing for Your Child's Surgery.    Preparing for surgery    Within 30 days of surgery: Have a pre-op exam (sometimes called an H&P, or History and Physical). This can be done at a clinic or pre-operative center.  ? If you're having a , you may not need this exam. Talk to your care team.    At your pre-op exam, talk to your care team about all medicines you take. If you need to stop any medicines before surgery, ask when to start taking them again.  ? We do this for your safety. Many medicines can make you bleed too much during surgery. Some change how well surgery (anesthesia) drugs work.    Call your insurance company to let them know you're having surgery. (If you don't have insurance, call [phone].)    Call your clinic if there's any change in your health. This includes signs of a cold or flu (sore throat, runny nose, cough, rash, fever). It also includes a scrape or scratch near the surgery site.    If you have questions on the day of surgery, call your hospital or surgery center.  COVID testing  You may need to be tested for COVID-19 before having  surgery. If so, we will give you instructions.  Eating and drinking guidelines  For your safety: Unless your surgeon tells you otherwise, follow the guidelines below.    Eat and drink as usual until 8 hours before surgery. After that, no food or milk.    Drink clear liquids until 2 hours before surgery. These are liquids you can see through, like water, Gatorade and Propel Water. You may also have black coffee and tea (no cream or milk).    Nothing by mouth within 2 hours of surgery. This includes gum, candy and breath mints.    If you drink alcohol: Stop drinking it the night before surgery.    If your care team tells you to take medicine on the morning of surgery, it's okay to take it with a sip of water.  Preventing infection    Shower or bathe the night before and morning of your surgery. Follow the instructions your clinic gave you. (If no instructions, use regular soap.)    Don't shave or clip hair near your surgery site. We'll remove the hair if needed.    Don't smoke or vape the morning of surgery. You may chew nicotine gum up to 2 hours before surgery. A nicotine patch is okay.  ? Note: Some surgeries require you to completely quit smoking and nicotine. Check with your surgeon.    Your care team will make every effort to keep you safe from infection. We will:  ? Clean our hands often with soap and water (or an alcohol-based hand rub).  ? Clean the skin at your surgery site with a special soap that kills germs.  ? Give you a special gown to keep you warm. (Cold raises the risk of infection.)  ? Wear special hair covers, masks, gowns and gloves during surgery.  ? Give antibiotic medicine, if prescribed. Not all surgeries need antibiotics.  What to bring on the day of surgery    Photo ID and insurance card    Copy of your health care directive, if you have one    Glasses and hearing aides (bring cases)  ? You can't wear contacts during surgery    Inhaler and eye drops, if you use them (tell us about these when  you arrive)    CPAP machine or breathing device, if you use them    A few personal items, if spending the night    If you have . . .  ? A pacemaker, ICD (cardiac defibrillator) or other implant: Bring the ID card.  ? An implanted stimulator: Bring the remote control.  ? A legal guardian: Bring a copy of the certified (court-stamped) guardianship papers.  Please remove any jewelry, including body piercings. Leave jewelry and other valuables at home.  If you're going home the day of surgery    You must have a responsible adult drive you home. They should stay with you overnight as well.    If you don't have someone to stay with you, and you aren't safe to go home alone, we may keep you overnight. Insurance often won't pay for this.  After surgery  If it's hard to control your pain or you need more pain medicine, please call your surgeon's office.  Questions?   If you have any questions for your care team, list them here: _________________________________________________________________________________________________________________________________________________________________________ ____________________________________ ____________________________________ ____________________________________  For informational purposes only. Not to replace the advice of your health care provider. Copyright   2003, 2019 [facility]. All rights reserved. Clinically reviewed by Kennedi Almaguer MD. [facility] [id number] - REV 07/21.

## 2021-12-29 NOTE — PROGRESS NOTES
[address] [address]  Phone: [phone]  Fax: [phone]  Primary Provider: Clinic, [facility]  Pre-op Performing Provider: SIGIFREDO PILLAI    PREOPERATIVE EVALUATION:  Today's date: 12/29/2021    John Nichols is a 41 year old male who presents for a preoperative evaluation.    Surgical Information:  Surgery/Procedure: Vasectomy Reversal  Surgery Location: [facility]  Surgeon: Dr. Martin  Surgery Date: 1/7/22  Time of Surgery: 11?  Where patient plans to recover: At home with family  Fax number for surgical facility: [phone]    Type of Anesthesia Anticipated: to be determined    Subjective     HPI related to upcoming procedure: vasectomy reversal    Preop Questions 12/29/2021   1. Have you ever had a heart attack or stroke? No   2. Have you ever had surgery on your heart or blood vessels, such as a stent placement, a coronary artery bypass, or surgery on an artery in your head, neck, heart, or legs? No   3. Do you have chest pain with activity? No   4. Do you have a history of  heart failure? No   5. Do you currently have a cold, bronchitis or symptoms of other infection? No   6. Do you have a cough, shortness of breath, or wheezing? No   7. Do you or anyone in your family have previous history of blood clots? No   8. Do you or does anyone in your family have a serious bleeding problem such as prolonged bleeding following surgeries or cuts? No   9. Have you ever had problems with anemia or been told to take iron pills? No   10. Have you had any abnormal blood loss such as black, tarry or bloody stools? No   11. Have you ever had a blood transfusion? No   12. Are you willing to have a blood transfusion if it is medically needed before, during, or after your surgery? Yes   13. Have you or any of your relatives ever had problems with anesthesia? No   14. Do you have sleep apnea, excessive snoring or daytime drowsiness?  No   15. Do you have any artifical heart valves or other implanted medical devices like a pacemaker, defibrillator, or continuous glucose monitor? No   16. Do you have artificial joints? No   17. Are you allergic to latex? No       Health Care Directive:  Patient does not have a Health Care Directive or Living Will: Discussed advance care planning with patient; information given to patient to review.    Preoperative Review of :   reviewed - no record of controlled substances prescribed.    Status of Chronic Conditions:  See problem list for active medical problems.  Problems all longstanding and stable, except as noted/documented.  See ROS for pertinent symptoms related to these conditions.      Review of Systems  INTEGUMENTARY/SKIN: NEGATIVE for worrisome rashes, moles or lesions  EYES: NEGATIVE for vision changes or irritation  ENT/MOUTH: NEGATIVE for ear, mouth and throat problems  RESP: NEGATIVE for significant cough or SOB  CV: NEGATIVE for chest pain, palpitations or peripheral edema  GI: NEGATIVE for nausea, abdominal pain, heartburn, or change in bowel habits  : NEGATIVE for frequency, dysuria, or hematuria  MUSCULOSKELETAL: NEGATIVE for significant arthralgias or myalgia  NEURO: NEGATIVE for weakness, dizziness or paresthesias  ENDOCRINE: NEGATIVE for temperature intolerance, skin/hair changes  HEME: NEGATIVE for bleeding problems  PSYCHIATRIC: NEGATIVE for changes in mood or affect    Patient Active Problem List    Diagnosis Date Noted     Onychomycosis 02/23/2018     Priority: Medium     Dysrhythmia, cardiac 10/26/2010     Priority: Medium     Dermatophytosis of nail 05/14/2007     Priority: Medium      Past Medical History:   Diagnosis Date     NO ACTIVE PROBLEMS      Past Surgical History:   Procedure Laterality Date     [facility] NONSPECIFIC PROCEDURE      Cyst removal on back     Current Outpatient Medications   Medication Sig Dispense Refill     lisinopril (ZESTRIL) 5 MG tablet Take 1 tablet (5 mg)  by mouth daily 90 tablet 1     metoprolol succinate ER (TOPROL XL) 25 MG 24 hr tablet Take 1 tablet (25 mg) by mouth daily 90 tablet 1       Allergies   Allergen Reactions     No Known Drug Allergies         Social History     Tobacco Use     Smoking status: Former Smoker     Smokeless tobacco: Never Used     Tobacco comment: Quit yrs ago-2002   Substance Use Topics     Alcohol use: Yes     Comment: 1 beer per day; few on Friday/Saturday night     Family History   Problem Relation Age of Onset     Diabetes Mother      Hypertension Father      Alcohol/Drug Father      Lipids Father      History   Drug Use No         Objective     /72   Pulse 98   Temp 97  F (36.1  C) (Temporal)   Wt 78.5 kg (173 lb)   SpO2 100%   BMI 25.55 kg/m      Physical Exam    GENERAL APPEARANCE: healthy, alert and no distress     EYES: EOMI,  PERRL     HENT: ear canals and TM's normal and nose and mouth without ulcers or lesions     NECK: no adenopathy, no asymmetry, masses, or scars and thyroid normal to palpation     RESP: lungs clear to auscultation - no rales, rhonchi or wheezes     CV: regular rates and rhythm, normal S1 S2, no S3 or S4 and no murmur, click or rub     ABDOMEN:  soft, nontender, no HSM or masses and bowel sounds normal     MS: extremities normal- no gross deformities noted, no evidence of inflammation in joints, FROM in all extremities.     SKIN: no suspicious lesions or rashes     NEURO: Normal strength and tone, sensory exam grossly normal, mentation intact and speech normal     PSYCH: mentation appears normal. and affect normal/bright     LYMPHATICS: No cervical adenopathy    Diagnostics:  No labs were ordered during this visit.   EKG: appears normal, NSR, normal axis, normal intervals, no acute ST/T changes c/w ischemia, no LVH by voltage criteria, unchanged from previous tracings   ECHO 01/22/2021:   1. Left ventricular systolic function is mildly reduced. The ejection fraction  is estimated at 40-45%.  Prominent apical trabaculations.  2. Normal sized atria.  3. No hemodynamically significant valvular disease.  4. Normal pulmonary artery pressure.  Compared to the previous echocardiogram on 10/21/2010, there are no  significant changes. Technically adequate study.    Revised Cardiac Risk Index (RCRI):  The patient has the following serious cardiovascular risks for perioperative complications:   - No serious cardiac risks = 0 points     RCRI Interpretation: 0 points: Class I (very low risk - 0.4% complication rate)          Assessment & Plan     The proposed surgical procedure is considered LOW risk.    Preop general physical exam    Hx of vasectomy    Cardiomyopathy, unspecified type (H)  Long-term stability - follows with cardiology.   - lisinopril (ZESTRIL) 5 MG tablet; Take 1 tablet (5 mg) by mouth daily  - metoprolol succinate ER (TOPROL XL) 25 MG 24 hr tablet; Take 1 tablet (25 mg) by mouth daily    Encounter for screening for COVID-19  - Asymptomatic COVID-19 Virus (Coronavirus) by PCR; Future      Risks and Recommendations:  The patient has the following additional risks and recommendations for perioperative complications:   - No identified additional risk factors other than previously addressed    Medication Instructions:   - ACE/ARB: May be continued on the day of surgery.    - Beta Blockers: Continue taking on the day of surgery.    RECOMMENDATION:  APPROVAL GIVEN to proceed with proposed procedure, without further diagnostic evaluation.    Signed Electronically by: Aurora Healy PA-C  Copy of this evaluation report is provided to requesting physician.

## 2022-01-04 ENCOUNTER — LAB (OUTPATIENT)
Dept: LAB | Facility: CLINIC | Age: 42
End: 2022-01-04
Payer: COMMERCIAL

## 2022-01-04 DIAGNOSIS — Z11.52 ENCOUNTER FOR SCREENING FOR COVID-19: ICD-10-CM

## 2022-01-04 PROCEDURE — U0005 INFEC AGEN DETEC AMPLI PROBE: HCPCS

## 2022-01-04 PROCEDURE — U0003 INFECTIOUS AGENT DETECTION BY NUCLEIC ACID (DNA OR RNA); SEVERE ACUTE RESPIRATORY SYNDROME CORONAVIRUS 2 (SARS-COV-2) (CORONAVIRUS DISEASE [COVID-19]), AMPLIFIED PROBE TECHNIQUE, MAKING USE OF HIGH THROUGHPUT TECHNOLOGIES AS DESCRIBED BY CMS-2020-01-R: HCPCS

## 2022-01-04 NOTE — LETTER
January 6, 2022      John Nichols  [address]  [place] [address]        Dear ,    We are writing to inform you of your test results.    Your test results fall within the expected range(s) or remain unchanged from previous results.  Please continue with current treatment plan.    Resulted Orders   Asymptomatic COVID-19 Virus (Coronavirus) by PCR Nose   Result Value Ref Range    SARS CoV2 PCR Negative Negative, Testing sent to reference lab. Results will be returned via unsolicited result      Comment:      NEGATIVE: SARS-CoV-2 (COVID-19) RNA not detected, presumed negative.    Narrative    Testing was performed using the nils SARS-CoV-2 assay on the nils  [employer] System. This test should be ordered for the detection of  SARS-CoV-2 in individuals who meet SARS-CoV-2 clinical and/or  epidemiological criteria. Test performance is unknown in asymptomatic  patients. This test is for in vitro diagnostic use under the FDA EUA  for laboratories certified under CLIA to perform high and/or moderate  complexity testing. This test has not been FDA cleared or approved. A  negative result does not rule out the presence of PCR inhibitors in  the specimen or target RNA in concentration below the limit of  detection for the assay. The possibility of a false negative should  be considered if the patient's recent exposure or clinical  presentation suggests COVID-19. This test was validated by the [facility]. This  laboratory is certified under the Clinical Laboratory Improvement  Amendments of 1988 (CLIA-88) as qualified to perform high and/or  moderate complexity laboratory testing.       If you have any questions or concerns, please call the clinic at the number listed above.       Sincerely,      Aurora Healy PA-C

## 2022-01-05 LAB — SARS-COV-2 RNA RESP QL NAA+PROBE: NEGATIVE

## 2022-01-05 NOTE — RESULT ENCOUNTER NOTE
Please fax result to [facility] [phone]    Aurora Healy PA-C

## 2022-01-27 ENCOUNTER — TRANSFERRED RECORDS (OUTPATIENT)
Dept: HEALTH INFORMATION MANAGEMENT | Facility: CLINIC | Age: 42
End: 2022-01-27
Payer: COMMERCIAL

## 2022-02-27 ENCOUNTER — HEALTH MAINTENANCE LETTER (OUTPATIENT)
Age: 42
End: 2022-02-27

## 2022-03-02 ENCOUNTER — TRANSFERRED RECORDS (OUTPATIENT)
Dept: HEALTH INFORMATION MANAGEMENT | Facility: CLINIC | Age: 42
End: 2022-03-02
Payer: COMMERCIAL

## 2022-03-15 ASSESSMENT — ENCOUNTER SYMPTOMS
SHORTNESS OF BREATH: 0
EYE PAIN: 0
DIARRHEA: 0
FEVER: 0
PARESTHESIAS: 0
HEMATOCHEZIA: 0
CHILLS: 0
JOINT SWELLING: 0
DIZZINESS: 0
ABDOMINAL PAIN: 0
HEMATURIA: 0
ARTHRALGIAS: 0
COUGH: 0
CONSTIPATION: 0
NERVOUS/ANXIOUS: 0
HEADACHES: 0
NAUSEA: 0
FREQUENCY: 0
HEARTBURN: 0
WEAKNESS: 0
MYALGIAS: 0
DYSURIA: 0
SORE THROAT: 0
PALPITATIONS: 0

## 2022-03-16 ENCOUNTER — OFFICE VISIT (OUTPATIENT)
Dept: FAMILY MEDICINE | Facility: CLINIC | Age: 42
End: 2022-03-16
Payer: COMMERCIAL

## 2022-03-16 VITALS
HEART RATE: 113 BPM | TEMPERATURE: 97.7 F | DIASTOLIC BLOOD PRESSURE: 70 MMHG | BODY MASS INDEX: 24.73 KG/M2 | WEIGHT: 167 LBS | HEIGHT: 69 IN | SYSTOLIC BLOOD PRESSURE: 126 MMHG | OXYGEN SATURATION: 100 %

## 2022-03-16 DIAGNOSIS — I42.9 CARDIOMYOPATHY, UNSPECIFIED TYPE (H): ICD-10-CM

## 2022-03-16 DIAGNOSIS — Z00.00 ROUTINE GENERAL MEDICAL EXAMINATION AT A HEALTH CARE FACILITY: Primary | ICD-10-CM

## 2022-03-16 DIAGNOSIS — Z13.220 LIPID SCREENING: ICD-10-CM

## 2022-03-16 DIAGNOSIS — I10 BENIGN ESSENTIAL HYPERTENSION: ICD-10-CM

## 2022-03-16 DIAGNOSIS — B35.1 ONYCHOMYCOSIS: ICD-10-CM

## 2022-03-16 DIAGNOSIS — Z13.29 SCREENING FOR THYROID DISORDER: ICD-10-CM

## 2022-03-16 LAB
ANION GAP SERPL CALCULATED.3IONS-SCNC: 3 MMOL/L (ref 3–14)
BUN SERPL-MCNC: 15 MG/DL (ref 7–30)
CALCIUM SERPL-MCNC: 9.6 MG/DL (ref 8.5–10.1)
CHLORIDE BLD-SCNC: 102 MMOL/L (ref 94–109)
CHOLEST SERPL-MCNC: 214 MG/DL
CO2 SERPL-SCNC: 31 MMOL/L (ref 20–32)
CREAT SERPL-MCNC: 1.05 MG/DL (ref 0.66–1.25)
FASTING STATUS PATIENT QL REPORTED: NO
GFR SERPL CREATININE-BSD FRML MDRD: >90 ML/MIN/1.73M2
GLUCOSE BLD-MCNC: 180 MG/DL (ref 70–99)
HDLC SERPL-MCNC: 68 MG/DL
LDLC SERPL CALC-MCNC: 126 MG/DL
NONHDLC SERPL-MCNC: 146 MG/DL
POTASSIUM BLD-SCNC: 4.2 MMOL/L (ref 3.4–5.3)
SODIUM SERPL-SCNC: 136 MMOL/L (ref 133–144)
TRIGL SERPL-MCNC: 98 MG/DL
TSH SERPL DL<=0.005 MIU/L-ACNC: 1.82 MU/L (ref 0.4–4)

## 2022-03-16 PROCEDURE — 80061 LIPID PANEL: CPT | Performed by: PHYSICIAN ASSISTANT

## 2022-03-16 PROCEDURE — 80048 BASIC METABOLIC PNL TOTAL CA: CPT | Performed by: PHYSICIAN ASSISTANT

## 2022-03-16 PROCEDURE — 99214 OFFICE O/P EST MOD 30 MIN: CPT | Mod: 25 | Performed by: PHYSICIAN ASSISTANT

## 2022-03-16 PROCEDURE — 36415 COLL VENOUS BLD VENIPUNCTURE: CPT | Performed by: PHYSICIAN ASSISTANT

## 2022-03-16 PROCEDURE — 84443 ASSAY THYROID STIM HORMONE: CPT | Performed by: PHYSICIAN ASSISTANT

## 2022-03-16 PROCEDURE — 99396 PREV VISIT EST AGE 40-64: CPT | Performed by: PHYSICIAN ASSISTANT

## 2022-03-16 RX ORDER — TERBINAFINE HYDROCHLORIDE 250 MG/1
250 TABLET ORAL DAILY
Qty: 90 TABLET | Refills: 0 | Status: SHIPPED | OUTPATIENT
Start: 2022-03-16 | End: 2023-10-30

## 2022-03-16 RX ORDER — METOPROLOL SUCCINATE 25 MG/1
25 TABLET, EXTENDED RELEASE ORAL DAILY
Qty: 90 TABLET | Refills: 1 | Status: SHIPPED | OUTPATIENT
Start: 2022-03-16 | End: 2022-04-25

## 2022-03-16 ASSESSMENT — ENCOUNTER SYMPTOMS
DYSURIA: 0
ABDOMINAL PAIN: 0
MYALGIAS: 0
HEMATOCHEZIA: 0
CONSTIPATION: 0
HEARTBURN: 0
NAUSEA: 0
DIARRHEA: 0
EYE PAIN: 0
ARTHRALGIAS: 0
CHILLS: 0
FEVER: 0
PARESTHESIAS: 0
HEMATURIA: 0
WEAKNESS: 0
JOINT SWELLING: 0
SORE THROAT: 0
HEADACHES: 0
NERVOUS/ANXIOUS: 0
PALPITATIONS: 0
FREQUENCY: 0
DIZZINESS: 0
SHORTNESS OF BREATH: 0
COUGH: 0

## 2022-03-16 ASSESSMENT — PAIN SCALES - GENERAL: PAINLEVEL: NO PAIN (0)

## 2022-03-16 NOTE — PROGRESS NOTES
SUBJECTIVE:   CC: John Nichols is an 41 year old male who presents for preventative health visit.   Patient has been advised of split billing requirements and indicates understanding: Yes  Healthy Habits:     Getting at least 3 servings of Calcium per day:  Yes    Bi-annual eye exam:  NO    Dental care twice a year:  Yes    Sleep apnea or symptoms of sleep apnea:  None    Diet:  Regular (no restrictions)    Frequency of exercise:  2-3 days/week    Duration of exercise:  45-60 minutes    Taking medications regularly:  Yes    Medication side effects:  Not applicable    PHQ-2 Total Score: 0    Additional concerns today:  No    PROBLEMS TO ADD ON...  -------------------------------------  Started Metoprolol. Blood pressure doing well but heart rate still elevated. Patient has a history of palpitations but reports that has not been bothersome. More often just feels antsy or restless. He was found to have cardiomyopathy in 2010. He continues to be very active and exercise frequently.     Would like to treat his toenail fungus again. Really only affects the left great toe. Treated this about 3 years ago and symptoms cleared up until just recently.     Had epidermal inclusion cyst removed 10/2020. This has since started to come back.    Today's PHQ-2 Score:   PHQ-2 ( 1999 Pfizer) 3/15/2022   Q1: Little interest or pleasure in doing things 0   Q2: Feeling down, depressed or hopeless 0   PHQ-2 Score 0   PHQ-2 Total Score (12-17 Years)- Positive if 3 or more points; Administer PHQ-A if positive -   Q1: Little interest or pleasure in doing things Not at all   Q2: Feeling down, depressed or hopeless Not at all   PHQ-2 Score 0       Abuse: Current or Past(Physical, Sexual or Emotional)- No  Do you feel safe in your environment? Yes    Have you ever done Advance Care Planning? (For example, a Health Directive, POLST, or a discussion with a medical provider or your loved ones about your wishes): No, advance care planning  information given to patient to review.  Patient declined advance care planning discussion at this time.    Social History     Tobacco Use     Smoking status: Former Smoker     Packs/day: 0.00     Years: 0.00     Pack years: 0.00     Smokeless tobacco: Never Used     Tobacco comment: Quit yrs ago-2002   Substance Use Topics     Alcohol use: Yes     Comment: 1 beer per day; few on Friday/Saturday night         Alcohol Use 3/15/2022   Prescreen: >3 drinks/day or >7 drinks/week? Yes   Prescreen: >3 drinks/day or >7 drinks/week? -   AUDIT SCORE  12       Last PSA: No results found for: PSA    Reviewed orders with patient. Reviewed health maintenance and updated orders accordingly - Yes  BP Readings from Last 3 Encounters:   03/16/22 126/70   12/29/21 115/72   05/15/21 126/81    Wt Readings from Last 3 Encounters:   03/16/22 75.8 kg (167 lb)   12/29/21 78.5 kg (173 lb)   05/15/21 73 kg (161 lb)                  Patient Active Problem List   Diagnosis     Dermatophytosis of nail     Dysrhythmia, cardiac     Onychomycosis     Past Surgical History:   Procedure Laterality Date     [facility] NONSPECIFIC PROCEDURE      Cyst removal on back       Social History     Tobacco Use     Smoking status: Former Smoker     Packs/day: 0.00     Years: 0.00     Pack years: 0.00     Smokeless tobacco: Never Used     Tobacco comment: Quit yrs ago-2002   Substance Use Topics     Alcohol use: Yes     Comment: 1 beer per day; few on Friday/Saturday night     Family History   Problem Relation Age of Onset     Diabetes Mother      Hypertension Father      Alcohol/Drug Father      Lipids Father          Current Outpatient Medications   Medication Sig Dispense Refill     lisinopril (ZESTRIL) 5 MG tablet Take 1 tablet (5 mg) by mouth daily 90 tablet 1     metoprolol succinate ER (TOPROL XL) 25 MG 24 hr tablet Take 1 tablet (25 mg) by mouth daily 90 tablet 1     terbinafine (LAMISIL) 250 MG tablet Take 1 tablet (250 mg) by mouth daily 90 tablet 0  "      Reviewed and updated as needed this visit by clinical staff   Tobacco  Allergies  Meds   Med Hx  Surg Hx  Fam Hx  Soc Hx        Reviewed and updated as needed this visit by Provider                     Review of Systems   Constitutional: Negative for chills and fever.   HENT: Negative for congestion, ear pain, hearing loss and sore throat.    Eyes: Negative for pain and visual disturbance.   Respiratory: Negative for cough and shortness of breath.    Cardiovascular: Negative for chest pain, palpitations and peripheral edema.   Gastrointestinal: Negative for abdominal pain, constipation, diarrhea, heartburn, hematochezia and nausea.   Genitourinary: Negative for dysuria, frequency, genital sores, hematuria, impotence, penile discharge and urgency.   Musculoskeletal: Negative for arthralgias, joint swelling and myalgias.   Skin: Negative for rash.   Neurological: Negative for dizziness, weakness, headaches and paresthesias.   Psychiatric/Behavioral: Negative for mood changes. The patient is not nervous/anxious.        OBJECTIVE:   /70   Pulse 113   Temp 97.7  F (36.5  C) (Temporal)   Ht 1.746 m (5' 8.75\")   Wt 75.8 kg (167 lb)   SpO2 100%   BMI 24.84 kg/m      Physical Exam  GENERAL: healthy, alert and no distress  EYES: Eyes grossly normal to inspection, PERRL and conjunctivae and sclerae normal  HENT: ear canals and TM's normal, nose and mouth without ulcers or lesions  NECK: no adenopathy, no asymmetry, masses, or scars and thyroid normal to palpation  RESP: lungs clear to auscultation - no rales, rhonchi or wheezes  CV: regular rate and rhythm, normal S1 S2, no S3 or S4, no murmur, click or rub, no peripheral edema and peripheral pulses strong  ABDOMEN: soft, nontender, no hepatosplenomegaly, no masses and bowel sounds normal  MS: no gross musculoskeletal defects noted, no edema  SKIN: no suspicious lesions or rashes  NEURO: Normal strength and tone, mentation intact and speech " "normal  PSYCH: mentation appears normal, affect normal/bright    Diagnostic Test Results:  Labs reviewed in Epic    ASSESSMENT/PLAN:   (Z00.00) Routine general medical examination at a health care facility  (primary encounter diagnosis)    (I10) Benign essential hypertension  Comment: Blood pressure well controlled on current regimen. Due for follow-up of labs today.   Plan: Basic metabolic panel  (Ca, Cl, CO2, Creat,         Gluc, K, Na, BUN)    (I42.9) Cardiomyopathy, unspecified type (H)  Comment: Recommended follow-up with cardiology. Heart rate continues to be elevated even with taking metoprolol consistently.   Plan: metoprolol succinate ER (TOPROL XL) 25 MG 24 hr        tablet, Adult Cardiology Eval [facility]         Referral    (Z13.220) Lipid screening  Plan: Lipid panel reflex to direct LDL Fasting    (Z13.29) Screening for thyroid disorder  Plan: TSH with free T4 reflex    (B35.1) Onychomycosis  Plan: terbinafine (LAMISIL) 250 MG tablet      Patient has been advised of split billing requirements and indicates understanding: Yes    COUNSELING:   Reviewed preventive health counseling, as reflected in patient instructions    Estimated body mass index is 24.84 kg/m  as calculated from the following:    Height as of this encounter: 1.746 m (5' 8.75\").    Weight as of this encounter: 75.8 kg (167 lb).         He reports that he has quit smoking. He smoked 0.00 packs per day for 0.00 years. He has never used smokeless tobacco.      Counseling Resources:  ATP IV Guidelines  Pooled Cohorts Equation Calculator  FRAX Risk Assessment  ICSI Preventive Guidelines  Dietary Guidelines for Americans, 2010  USDA's MyPlate  ASA Prophylaxis  Lung CA Screening    FATEMEH [facility]  "

## 2022-03-30 ENCOUNTER — OFFICE VISIT (OUTPATIENT)
Dept: FAMILY MEDICINE | Facility: CLINIC | Age: 42
End: 2022-03-30
Payer: COMMERCIAL

## 2022-03-30 VITALS
TEMPERATURE: 97.4 F | SYSTOLIC BLOOD PRESSURE: 120 MMHG | WEIGHT: 170.5 LBS | HEART RATE: 107 BPM | BODY MASS INDEX: 25.36 KG/M2 | RESPIRATION RATE: 16 BRPM | OXYGEN SATURATION: 96 % | DIASTOLIC BLOOD PRESSURE: 70 MMHG

## 2022-03-30 DIAGNOSIS — L72.3 SEBACEOUS CYST: Primary | ICD-10-CM

## 2022-03-30 PROCEDURE — 11401 EXC TR-EXT B9+MARG 0.6-1 CM: CPT | Performed by: PHYSICIAN ASSISTANT

## 2022-03-30 ASSESSMENT — PAIN SCALES - GENERAL: PAINLEVEL: NO PAIN (0)

## 2022-03-30 NOTE — PROGRESS NOTES
Paul Bains is a 41 year old who presents for the following health issues    History of Present Illness       Reason for visit:  Bump on back  Symptoms include:  There is a bump  Symptom intensity:  Mild  Symptom progression:  Worsening  Had these symptoms before:  Yes  Has tried/received treatment for these symptoms:  Yes  Previous treatment was successful:  No  What makes it worse:  No  What makes it better:  No    He eats 2-3 servings of fruits and vegetables daily.He consumes 0 sweetened beverage(s) daily.He exercises with enough effort to increase his heart rate 9 or less minutes per day.  He exercises with enough effort to increase his heart rate 3 or less days per week.   He is taking medications regularly.     Patient presents today for cyst removal. This was previously removed several years ago and has started to grow back. Not tender. No drainage. Examined during recent physical.     Review of Systems   Constitutional, HEENT, cardiovascular, pulmonary, gi and gu systems are negative, except as otherwise noted.      Objective    /70   Pulse 107   Temp 97.4  F (36.3  C) (Temporal)   Resp 16   Wt 77.3 kg (170 lb 8 oz)   SpO2 96%   BMI 25.36 kg/m    Body mass index is 25.36 kg/m .  Physical Exam   GENERAL: healthy, alert and no distress  SKIN: 6 mm in diameter cyst on right mid-back  PSYCH: mentation appears normal, affect normal/bright    Above lesions cleaned with alcohol x 3. Anesthesia obtained using 1% Lidocaine with epi. Using an #11 blade a small incision was made. Care was take to remove cyst entirely. Area closed with 1-simple interrupted suture using 4-0 Prolene. Dressing applied.     Assessment & Plan     Sebaceous cyst  Lesion removed as above. Local wound care discussed. Suture removal in 10 days. Patient will follow-up in clinic if new symptoms develop or current symptoms fail to improve.  - EXC BENIGN SKIN LESION TRUNK/ARM/LEG 0.6-1.0 CM    The patient indicates understanding  of these issues and agrees with the plan.    Aurora Healy PA-C  [facility]

## 2022-04-25 ENCOUNTER — OFFICE VISIT (OUTPATIENT)
Dept: CARDIOLOGY | Facility: CLINIC | Age: 42
End: 2022-04-25
Payer: COMMERCIAL

## 2022-04-25 VITALS
HEART RATE: 70 BPM | WEIGHT: 169.7 LBS | HEIGHT: 69 IN | BODY MASS INDEX: 25.13 KG/M2 | SYSTOLIC BLOOD PRESSURE: 118 MMHG | OXYGEN SATURATION: 98 % | DIASTOLIC BLOOD PRESSURE: 62 MMHG

## 2022-04-25 DIAGNOSIS — I42.9 CARDIOMYOPATHY, UNSPECIFIED TYPE (H): ICD-10-CM

## 2022-04-25 PROCEDURE — 99214 OFFICE O/P EST MOD 30 MIN: CPT | Performed by: INTERNAL MEDICINE

## 2022-04-25 RX ORDER — METOPROLOL SUCCINATE 50 MG/1
50 TABLET, EXTENDED RELEASE ORAL DAILY
Qty: 90 TABLET | Refills: 3 | Status: SHIPPED | OUTPATIENT
Start: 2022-04-25 | End: 2023-04-10

## 2022-04-25 RX ORDER — LISINOPRIL 10 MG/1
10 TABLET ORAL DAILY
Qty: 90 TABLET | Refills: 3 | Status: SHIPPED | OUTPATIENT
Start: 2022-04-25 | End: 2023-04-10

## 2022-04-25 NOTE — LETTER
4/25/2022    [address] [address]    RE: John Nichols       Dear Colleague,     I had the pleasure of seeing John Nichols in the [facility].  HISTORY:    John Nichols is a very pleasant 41-year-old male with a history of nonischemic cardiomyopathy diagnosed in 2010.  This was suspected to be secondary to heavy alcohol use.  His initial ejection fraction was 20% but he cut back on alcohol use and his ejection fraction increased to 40 to 45%.  Herson is generally in good health.  He was last seen a year ago at which time a prescription for lisinopril 5 mg daily and Toprol-XL 25 mg was written.    Today Herson reports that he waited about 6 months after our last visit to initiate the lisinopril and Toprol, but he has been using it now for about 6 months on a daily basis.  He is tolerating this without side effects.  Interestingly, he reports that he never had any symptoms from his very low ejection fraction.  He was running 3-1/2 miles per day and occasionally as much is 20 miles without difficulty.  Currently, he has cut back considerably on his exercise ever since having a vasectomy reversal several months ago.  This is left him very uncomfortable particularly with vigorous exercise.  He was, however, running on a regular basis until his surgical procedure.  He was also doing a fair amount of weight lifting, but he is also doing less of that now.    Herson unfortunately reports that he had cut back considerably on his alcohol but is now drinking heavily.  Previously his wife and he were on different shifts and they would drink together when they had time off.  They are now on the same shift and he reports that he is back to drinking 10-12 beers a day.  He has not experienced any chest arm neck shoulder or jaw discomfort, palpitations, PND/orthopnea, syncope or near syncope, peripheral edema, or claudication.  He feels that his energy  remains normal but he is less active since his vasectomy reversal surgery.    Herson reports that he found out that his mother had a weak heart.  She is still alive.  Both of her mother's parents lived to an old age but also had some type of heart problems, details unknown.  Herson is unclear about the exact details of his mother's problem.    Herson also reports that he has been checking his pulse on an occasional basis at the insistence of his wife, who is a nurse.  He generally finds it to be high but he uses his smart phone to check it and he does not think this is reliable.  He is not aware of any sense of racing heart or even palpitations.    Examination today is normal.      ASSESSMENT/PLAN:    1.  Nonischemic cardiomyopathy, presumed to be alcohol induced.  His ejection fraction improved when he cut back considerably on alcohol use but he is now back to drinking 10-12 beers most days of the week.  Even when his ejection fraction was low he was asymptomatic.  I will have an echocardiogram repeated to reevaluate ejection fraction.  If it is low once again this probably indicates that it was alcohol induced.  Given his mother's suspected history, familial cardiomyopathy is also a possibility.  Herson assures me that as far as he knows there is no family history of premature or sudden death.  2.  Alcohol use.  I warned him that his current drinking habits are excessive and put him at substantial risk of liver cirrhosis.  He is planning on starting a family (hence the vasectomy reversal) and I pointed out that his drinking risks his children growing up without 1 or both parents.  He acknowledges that he understands that he is drinking too much and that he will need to change his habits.    Thank you for inviting me to participate in the care of your patient.  Please don't hesitate to call if I can be of further assistance.  34 minutes were spent today reviewing the chart and other records, interviewing and examining the  patient, and documenting our visit.    Chart documentation was completed, in part, with [employer] voice-recognition software. Even though reviewed, some grammatical, spelling, and word errors may remain.       Orders Placed This Encounter   Procedures     Follow-Up with Cardiology     Echocardiogram Complete     Orders Placed This Encounter   Medications     lisinopril (ZESTRIL) 10 MG tablet     Sig: Take 1 tablet (10 mg) by mouth daily     Dispense:  90 tablet     Refill:  3     metoprolol succinate ER (TOPROL XL) 50 MG 24 hr tablet     Sig: Take 1 tablet (50 mg) by mouth daily     Dispense:  90 tablet     Refill:  3     Medications Discontinued During This Encounter   Medication Reason     lisinopril (ZESTRIL) 5 MG tablet      metoprolol succinate ER (TOPROL XL) 25 MG 24 hr tablet Reorder       10 year ASCVD risk: The 10-year ASCVD risk score (Paris REN Jr., et al., 2013) is: 0.9%    Values used to calculate the score:      Age: 41 years      Sex: Male      Is Non- : No      Diabetic: No      Tobacco smoker: No      Systolic Blood Pressure: 118 mmHg      Is BP treated: Yes      HDL Cholesterol: 68 mg/dL      Total Cholesterol: 214 mg/dL    Encounter Diagnosis   Name Primary?     Cardiomyopathy, unspecified type (H)        CURRENT MEDICATIONS:  Current Outpatient Medications   Medication Sig Dispense Refill     lisinopril (ZESTRIL) 10 MG tablet Take 1 tablet (10 mg) by mouth daily 90 tablet 3     metoprolol succinate ER (TOPROL XL) 50 MG 24 hr tablet Take 1 tablet (50 mg) by mouth daily 90 tablet 3     terbinafine (LAMISIL) 250 MG tablet Take 1 tablet (250 mg) by mouth daily 90 tablet 0       ALLERGIES     Allergies   Allergen Reactions     No Known Drug Allergies        PAST MEDICAL HISTORY:  Past Medical History:   Diagnosis Date     Cardiomyopathy (H)      Dysrhythmia, cardiac      NO ACTIVE PROBLEMS        PAST SURGICAL HISTORY:  Past Surgical History:   Procedure Laterality Date     [facility]  "NONSPECIFIC PROCEDURE      Cyst removal on back       FAMILY HISTORY:  Family History   Problem Relation Age of Onset     Diabetes Mother      Hypertension Father      Alcohol/Drug Father      Lipids Father        SOCIAL HISTORY:  Social History     Socioeconomic History     Marital status:    Occupational History     Occupation:    Tobacco Use     Smoking status: Former Smoker     Packs/day: 0.00     Years: 0.00     Pack years: 0.00     Smokeless tobacco: Never Used     Tobacco comment: Quit yrs ago-2002   Vaping Use     Vaping Use: Never used   Substance and Sexual Activity     Alcohol use: Yes     Comment: 1 beer per day; few on Friday/Saturday night     Drug use: No     Sexual activity: Yes     Partners: Female     Birth control/protection: None   Other Topics Concern      Service No     Blood Transfusions No     Caffeine Concern Yes     Comment: affects irregular heart beat     Occupational Exposure Yes     Comment: noise and heat      Hobby Hazards No     Sleep Concern No     Stress Concern No     Weight Concern No     Special Diet No     Back Care No     Exercise Yes     Comment: occ     Bike Helmet No     Seat Belt Yes     Self-Exams Yes     Parent/sibling w/ CABG, MI or angioplasty before 65F 55M? No       Review of Systems:  Skin:  Negative     Eyes:  Negative    ENT:  Negative    Respiratory:  Negative    Cardiovascular:  Negative for;chest pain;edema;lightheadedness;dizziness Positive for;palpitations  Gastroenterology: Negative    Genitourinary:  Negative    Musculoskeletal:  Negative    Neurologic:  Negative    Psychiatric:  Negative    Heme/Lymph/Imm:  Negative    Endocrine:  Negative      Physical Exam:  Vitals: /62 (BP Location: Right arm, Patient Position: Sitting, Cuff Size: Adult Regular)   Pulse 70   Ht 1.746 m (5' 8.75\")   Wt 77 kg (169 lb 11.2 oz)   SpO2 98%   BMI 25.24 kg/m      Constitutional:  cooperative, alert and oriented, well developed, well nourished, " in no acute distress   Fit in appearance    Skin:  warm and dry to the touch, no apparent skin lesions or masses noted        Head:  normocephalic        Eyes:  sclera white;no nystagmus;no xanthalasma        ENT:  no pallor or cyanosis   masked    Neck:  carotid pulses are full and equal bilaterally, JVP normal, no carotid bruit        Chest:  normal breath sounds, clear to auscultation, normal A-P diameter, normal symmetry, normal respiratory excursion, no use of accessory muscles        Cardiac: regular rhythm, normal S1/S2, no S3 or S4, apical impulse not displaced, no murmurs, gallops or rubs                  Abdomen:  abdomen soft;BS normoactive        Vascular: pulses full and equal                                      Extremities and Muscular Skeletal:  no edema           Neurological:  no gross motor deficits        Psych:  affect appropriate, oriented to time, person and place     Recent Lab Results:  LIPID RESULTS:  Lab Results   Component Value Date    CHOL 214 (H) 03/16/2022    CHOL 211 (H) 01/21/2021    HDL 68 03/16/2022    HDL 57 01/21/2021     (H) 03/16/2022     (H) 01/21/2021    TRIG 98 03/16/2022    TRIG 118 01/21/2021    CHOLHDLRATIO 3.0 09/04/2012       LIVER ENZYME RESULTS:  Lab Results   Component Value Date    AST 30 01/21/2021    ALT 65 01/21/2021       CBC RESULTS:  Lab Results   Component Value Date    WBC 6.0 11/09/2010    RBC 4.79 11/09/2010    HGB 13.3 11/09/2010    HCT 42.1 11/09/2010    MCV 88 11/09/2010    MCH 27.8 11/09/2010    MCHC 31.6 11/09/2010    RDW 13.5 11/09/2010     11/09/2010       BMP RESULTS:  Lab Results   Component Value Date     03/16/2022     11/09/2010    POTASSIUM 4.2 03/16/2022    POTASSIUM 4.6 11/09/2010    CHLORIDE 102 03/16/2022    CHLORIDE 103 11/09/2010    CO2 31 03/16/2022    CO2 32 11/09/2010    ANIONGAP 3 03/16/2022    ANIONGAP 7.8 11/09/2010     (H) 03/16/2022    GLC 89 11/09/2017    BUN 15 03/16/2022    BUN 23  11/09/2010    CR 1.05 03/16/2022    CR 1.22 11/09/2010    GFRESTIMATED >90 03/16/2022    GFRESTIMATED 70 11/09/2010    GFRESTBLACK 84 11/09/2010    KERWIN 9.6 03/16/2022    KERWIN 9.7 11/09/2010        A1C RESULTS:  No results found for: A1C    INR RESULTS:  No results found for: INR      Thony Mcfadden MD, FACC    CC  Aurora Healy PA-C  [address] [place] [address]    Thank you for allowing me to participate in the care of your patient.      Sincerely,     Thony Mcfadden MD     [facility]

## 2022-04-25 NOTE — PROGRESS NOTES
HISTORY:    John Nichols is a very pleasant 41-year-old male with a history of nonischemic cardiomyopathy diagnosed in 2010.  This was suspected to be secondary to heavy alcohol use.  His initial ejection fraction was 20% but he cut back on alcohol use and his ejection fraction increased to 40 to 45%.  Herson is generally in good health.  He was last seen a year ago at which time a prescription for lisinopril 5 mg daily and Toprol-XL 25 mg was written.    Today Herson reports that he waited about 6 months after our last visit to initiate the lisinopril and Toprol, but he has been using it now for about 6 months on a daily basis.  He is tolerating this without side effects.  Interestingly, he reports that he never had any symptoms from his very low ejection fraction.  He was running 3-1/2 miles per day and occasionally as much is 20 miles without difficulty.  Currently, he has cut back considerably on his exercise ever since having a vasectomy reversal several months ago.  This is left him very uncomfortable particularly with vigorous exercise.  He was, however, running on a regular basis until his surgical procedure.  He was also doing a fair amount of weight lifting, but he is also doing less of that now.    Herson unfortunately reports that he had cut back considerably on his alcohol but is now drinking heavily.  Previously his wife and he were on different shifts and they would drink together when they had time off.  They are now on the same shift and he reports that he is back to drinking 10-12 beers a day.  He has not experienced any chest arm neck shoulder or jaw discomfort, palpitations, PND/orthopnea, syncope or near syncope, peripheral edema, or claudication.  He feels that his energy remains normal but he is less active since his vasectomy reversal surgery.    Herson reports that he found out that his mother had a weak heart.  She is still alive.  Both of her mother's parents lived to an old age but also  had some type of heart problems, details unknown.  Herson is unclear about the exact details of his mother's problem.    Herson also reports that he has been checking his pulse on an occasional basis at the insistence of his wife, who is a nurse.  He generally finds it to be high but he uses his smart phone to check it and he does not think this is reliable.  He is not aware of any sense of racing heart or even palpitations.    Examination today is normal.      ASSESSMENT/PLAN:    1.  Nonischemic cardiomyopathy, presumed to be alcohol induced.  His ejection fraction improved when he cut back considerably on alcohol use but he is now back to drinking 10-12 beers most days of the week.  Even when his ejection fraction was low he was asymptomatic.  I will have an echocardiogram repeated to reevaluate ejection fraction.  If it is low once again this probably indicates that it was alcohol induced.  Given his mother's suspected history, familial cardiomyopathy is also a possibility.  Herson assures me that as far as he knows there is no family history of premature or sudden death.  2.  Alcohol use.  I warned him that his current drinking habits are excessive and put him at substantial risk of liver cirrhosis.  He is planning on starting a family (hence the vasectomy reversal) and I pointed out that his drinking risks his children growing up without 1 or both parents.  He acknowledges that he understands that he is drinking too much and that he will need to change his habits.    Thank you for inviting me to participate in the care of your patient.  Please don't hesitate to call if I can be of further assistance.  34 minutes were spent today reviewing the chart and other records, interviewing and examining the patient, and documenting our visit.    Chart documentation was completed, in part, with [employer] voice-recognition software. Even though reviewed, some grammatical, spelling, and word errors may remain.       Orders Placed  This Encounter   Procedures     Follow-Up with Cardiology     Echocardiogram Complete     Orders Placed This Encounter   Medications     lisinopril (ZESTRIL) 10 MG tablet     Sig: Take 1 tablet (10 mg) by mouth daily     Dispense:  90 tablet     Refill:  3     metoprolol succinate ER (TOPROL XL) 50 MG 24 hr tablet     Sig: Take 1 tablet (50 mg) by mouth daily     Dispense:  90 tablet     Refill:  3     Medications Discontinued During This Encounter   Medication Reason     lisinopril (ZESTRIL) 5 MG tablet      metoprolol succinate ER (TOPROL XL) 25 MG 24 hr tablet Reorder       10 year ASCVD risk: The 10-year ASCVD risk score (Paris REN Jr., et al., 2013) is: 0.9%    Values used to calculate the score:      Age: 41 years      Sex: Male      Is Non- : No      Diabetic: No      Tobacco smoker: No      Systolic Blood Pressure: 118 mmHg      Is BP treated: Yes      HDL Cholesterol: 68 mg/dL      Total Cholesterol: 214 mg/dL    Encounter Diagnosis   Name Primary?     Cardiomyopathy, unspecified type (H)        CURRENT MEDICATIONS:  Current Outpatient Medications   Medication Sig Dispense Refill     lisinopril (ZESTRIL) 10 MG tablet Take 1 tablet (10 mg) by mouth daily 90 tablet 3     metoprolol succinate ER (TOPROL XL) 50 MG 24 hr tablet Take 1 tablet (50 mg) by mouth daily 90 tablet 3     terbinafine (LAMISIL) 250 MG tablet Take 1 tablet (250 mg) by mouth daily 90 tablet 0       ALLERGIES     Allergies   Allergen Reactions     No Known Drug Allergies        PAST MEDICAL HISTORY:  Past Medical History:   Diagnosis Date     Cardiomyopathy (H)      Dysrhythmia, cardiac      NO ACTIVE PROBLEMS        PAST SURGICAL HISTORY:  Past Surgical History:   Procedure Laterality Date     ZZC NONSPECIFIC PROCEDURE      Cyst removal on back       FAMILY HISTORY:  Family History   Problem Relation Age of Onset     Diabetes Mother      Hypertension Father      Alcohol/Drug Father      Lipids Father        SOCIAL  "HISTORY:  Social History     Socioeconomic History     Marital status:    Occupational History     Occupation:    Tobacco Use     Smoking status: Former Smoker     Packs/day: 0.00     Years: 0.00     Pack years: 0.00     Smokeless tobacco: Never Used     Tobacco comment: Quit yrs ago-2002   Vaping Use     Vaping Use: Never used   Substance and Sexual Activity     Alcohol use: Yes     Comment: 1 beer per day; few on Friday/Saturday night     Drug use: No     Sexual activity: Yes     Partners: Female     Birth control/protection: None   Other Topics Concern      Service No     Blood Transfusions No     Caffeine Concern Yes     Comment: affects irregular heart beat     Occupational Exposure Yes     Comment: noise and heat      Hobby Hazards No     Sleep Concern No     Stress Concern No     Weight Concern No     Special Diet No     Back Care No     Exercise Yes     Comment: occ     Bike Helmet No     Seat Belt Yes     Self-Exams Yes     Parent/sibling w/ CABG, MI or angioplasty before 65F 55M? No       Review of Systems:  Skin:  Negative     Eyes:  Negative    ENT:  Negative    Respiratory:  Negative    Cardiovascular:  Negative for;chest pain;edema;lightheadedness;dizziness Positive for;palpitations  Gastroenterology: Negative    Genitourinary:  Negative    Musculoskeletal:  Negative    Neurologic:  Negative    Psychiatric:  Negative    Heme/Lymph/Imm:  Negative    Endocrine:  Negative      Physical Exam:  Vitals: /62 (BP Location: Right arm, Patient Position: Sitting, Cuff Size: Adult Regular)   Pulse 70   Ht 1.746 m (5' 8.75\")   Wt 77 kg (169 lb 11.2 oz)   SpO2 98%   BMI 25.24 kg/m      Constitutional:  cooperative, alert and oriented, well developed, well nourished, in no acute distress   Fit in appearance    Skin:  warm and dry to the touch, no apparent skin lesions or masses noted        Head:  normocephalic        Eyes:  sclera white;no nystagmus;no xanthalasma        ENT:  no " pallor or cyanosis   masked    Neck:  carotid pulses are full and equal bilaterally, JVP normal, no carotid bruit        Chest:  normal breath sounds, clear to auscultation, normal A-P diameter, normal symmetry, normal respiratory excursion, no use of accessory muscles        Cardiac: regular rhythm, normal S1/S2, no S3 or S4, apical impulse not displaced, no murmurs, gallops or rubs                  Abdomen:  abdomen soft;BS normoactive        Vascular: pulses full and equal                                      Extremities and Muscular Skeletal:  no edema           Neurological:  no gross motor deficits        Psych:  affect appropriate, oriented to time, person and place     Recent Lab Results:  LIPID RESULTS:  Lab Results   Component Value Date    CHOL 214 (H) 03/16/2022    CHOL 211 (H) 01/21/2021    HDL 68 03/16/2022    HDL 57 01/21/2021     (H) 03/16/2022     (H) 01/21/2021    TRIG 98 03/16/2022    TRIG 118 01/21/2021    CHOLHDLRATIO 3.0 09/04/2012       LIVER ENZYME RESULTS:  Lab Results   Component Value Date    AST 30 01/21/2021    ALT 65 01/21/2021       CBC RESULTS:  Lab Results   Component Value Date    WBC 6.0 11/09/2010    RBC 4.79 11/09/2010    HGB 13.3 11/09/2010    HCT 42.1 11/09/2010    MCV 88 11/09/2010    MCH 27.8 11/09/2010    MCHC 31.6 11/09/2010    RDW 13.5 11/09/2010     11/09/2010       BMP RESULTS:  Lab Results   Component Value Date     03/16/2022     11/09/2010    POTASSIUM 4.2 03/16/2022    POTASSIUM 4.6 11/09/2010    CHLORIDE 102 03/16/2022    CHLORIDE 103 11/09/2010    CO2 31 03/16/2022    CO2 32 11/09/2010    ANIONGAP 3 03/16/2022    ANIONGAP 7.8 11/09/2010     (H) 03/16/2022    GLC 89 11/09/2017    BUN 15 03/16/2022    BUN 23 11/09/2010    CR 1.05 03/16/2022    CR 1.22 11/09/2010    GFRESTIMATED >90 03/16/2022    GFRESTIMATED 70 11/09/2010    GFRESTBLACK 84 11/09/2010    KERWIN 9.6 03/16/2022    KERWIN 9.7 11/09/2010        A1C RESULTS:  No results  found for: A1C    INR RESULTS:  No results found for: INR      Thony Mcfadden MD, FACC    CC  Aurora Healy PA-C  [address] [place] [address]

## 2022-05-05 ENCOUNTER — HOSPITAL ENCOUNTER (OUTPATIENT)
Dept: CARDIOLOGY | Facility: CLINIC | Age: 42
Discharge: HOME | End: 2022-05-05
Attending: INTERNAL MEDICINE | Admitting: INTERNAL MEDICINE
Payer: COMMERCIAL

## 2022-05-05 DIAGNOSIS — I42.9 CARDIOMYOPATHY, UNSPECIFIED TYPE (H): ICD-10-CM

## 2022-05-05 LAB — LVEF ECHO: NORMAL

## 2022-05-05 PROCEDURE — 93306 TTE W/DOPPLER COMPLETE: CPT | Mod: 26 | Performed by: INTERNAL MEDICINE

## 2022-05-05 PROCEDURE — 93306 TTE W/DOPPLER COMPLETE: CPT

## 2022-05-10 DIAGNOSIS — I42.9 CARDIOMYOPATHY, UNSPECIFIED TYPE (H): Primary | ICD-10-CM

## 2022-05-31 ENCOUNTER — HOSPITAL ENCOUNTER (EMERGENCY)
Facility: CLINIC | Age: 42
Discharge: LEFT BEFORE DISCHARGE | End: 2022-05-31
Payer: COMMERCIAL

## 2022-05-31 VITALS
BODY MASS INDEX: 25.14 KG/M2 | HEART RATE: 69 BPM | WEIGHT: 169 LBS | OXYGEN SATURATION: 100 % | SYSTOLIC BLOOD PRESSURE: 112 MMHG | RESPIRATION RATE: 16 BRPM | TEMPERATURE: 98.1 F | DIASTOLIC BLOOD PRESSURE: 79 MMHG

## 2022-05-31 NOTE — ED TRIAGE NOTES
"Pt presents with blurred vision and seeing a \"line\" in eye that started at 0600. 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 Pt states had safety glasses on.       "

## 2022-05-31 NOTE — ED NOTES
Pt left after triage. Multiple patients long wait. This RN did talk with Dr. Fish about his symptoms stating he could wait. Pt states he had to leave per registration staff. Pt stated he would find other options.

## 2022-06-01 ENCOUNTER — TRANSFERRED RECORDS (OUTPATIENT)
Dept: HEALTH INFORMATION MANAGEMENT | Facility: CLINIC | Age: 42
End: 2022-06-01
Payer: COMMERCIAL

## 2022-11-03 ENCOUNTER — TRANSFERRED RECORDS (OUTPATIENT)
Dept: HEALTH INFORMATION MANAGEMENT | Facility: CLINIC | Age: 42
End: 2022-11-03

## 2022-11-19 ENCOUNTER — HEALTH MAINTENANCE LETTER (OUTPATIENT)
Age: 42
End: 2022-11-19

## 2023-04-06 ENCOUNTER — HOSPITAL ENCOUNTER (OUTPATIENT)
Dept: CARDIOLOGY | Facility: CLINIC | Age: 43
Discharge: HOME | End: 2023-04-06
Attending: INTERNAL MEDICINE | Admitting: INTERNAL MEDICINE
Payer: COMMERCIAL

## 2023-04-06 DIAGNOSIS — I42.9 CARDIOMYOPATHY, UNSPECIFIED TYPE (H): ICD-10-CM

## 2023-04-06 LAB — LVEF ECHO: NORMAL

## 2023-04-06 PROCEDURE — 93306 TTE W/DOPPLER COMPLETE: CPT | Mod: 26 | Performed by: INTERNAL MEDICINE

## 2023-04-06 PROCEDURE — 93306 TTE W/DOPPLER COMPLETE: CPT

## 2023-04-06 NOTE — RESULT ENCOUNTER NOTE
Good results.  This reader estimated EF slightly lower than last reader, but this is not a meaningful difference.  No action needed.     Results and recommendations routed Via My Chart with call back information if needed.

## 2023-04-10 ENCOUNTER — OFFICE VISIT (OUTPATIENT)
Dept: CARDIOLOGY | Facility: CLINIC | Age: 43
End: 2023-04-10
Attending: INTERNAL MEDICINE
Payer: COMMERCIAL

## 2023-04-10 VITALS
BODY MASS INDEX: 25.73 KG/M2 | SYSTOLIC BLOOD PRESSURE: 116 MMHG | OXYGEN SATURATION: 99 % | DIASTOLIC BLOOD PRESSURE: 64 MMHG | WEIGHT: 173.7 LBS | HEART RATE: 70 BPM | HEIGHT: 69 IN

## 2023-04-10 DIAGNOSIS — I42.9 CARDIOMYOPATHY, UNSPECIFIED TYPE (H): ICD-10-CM

## 2023-04-10 PROCEDURE — 99214 OFFICE O/P EST MOD 30 MIN: CPT | Performed by: INTERNAL MEDICINE

## 2023-04-10 RX ORDER — METOPROLOL SUCCINATE 100 MG/1
100 TABLET, EXTENDED RELEASE ORAL DAILY
Qty: 90 TABLET | Refills: 3 | Status: SHIPPED | OUTPATIENT
Start: 2023-04-10 | End: 2023-08-01

## 2023-04-10 RX ORDER — LISINOPRIL 20 MG/1
20 TABLET ORAL DAILY
Qty: 90 TABLET | Refills: 3 | Status: SHIPPED | OUTPATIENT
Start: 2023-04-10 | End: 2023-08-01

## 2023-04-10 NOTE — PROGRESS NOTES
"HISTORY:    John Nichols is a very pleasant 42-year-old male with a history of nonischemic cardiomyopathy diagnosed in 2010, suspected to be secondary to heavy alcohol use.  Initially his ejection fraction was 20% but with reduction of alcohol use and initiation of medications his EF rachna to about 45%.  He is here today for his annual visit.    Today Herson reports that he continues to do well.  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  He also states that he is physically very active.  He works out at the gym regularly and does some jogging, although less recently since he strained his low back.  He feels that his energy and stamina are completely normal and, interestingly, even when he had a low ejection fraction he had normal energy with the ability to jog 20 miles without symptoms.    Herson continues to consume a fair amount of alcohol.  He states that most days he tries to keep the count of his daily beers \"in the single digits\".  When I once again pointed out that this is not a healthy amount of alcohol to be using he reports that he has been thinking of cutting back or giving this up entirely, but he is not committed to it.  I reminded him that alcohol is a cardiotoxin as well as causing liver toxicity and it certainly would be in his best health interest to give it up or cut back considerably.    Herson denies exertional chest pain, palpitations, PND/orthopnea, syncope or near syncope, strokelike symptoms, peripheral edema, or claudication.  He feels that his energy and stamina are above average for his age are gradually decreasing as the years go by.    A recent echocardiogram was completed and shows an ejection fraction of 45 to 50% with mild LV dilation at 6.1 cm.  No valvular dysfunction is identified.  This is essentially unchanged from his last echo a year ago.      ASSESSMENT/PLAN:    1.  Nonischemic cardiomyopathy, suspected secondary to alcohol use although " certainly possibly idiopathic.  Today I have increased his dose of lisinopril from 10 mg to 20 mg and I we will have him also double his metoprolol from 50 mg daily to 100 mg daily.  I warned him of potential side effects and asked him to contact me if he experiences problems.  We also spoke briefly about the possibility of using either Entresto or Jardiance, but both are likely to be very expensive and likely not of great benefit to him since he has excellent energy and exercise capacity and his ejection fraction has been stable for at least a decade.  Should his ejection fraction decrease in the future these agents could be reconsidered.  As mentioned above I encouraged alcohol cessation.    Thank you for inviting me to participate in the care of your patient.  Please don't hesitate to call if I can be of further assistance.  30 minutes were spent today reviewing the chart and other records, interviewing and examining the patient, and documenting our visit.    Chart documentation was completed, in part, with [employer] voice-recognition software. Even though reviewed, some grammatical, spelling, and word errors may remain.       Orders Placed This Encounter   Procedures     Basic metabolic panel     Follow-Up with Cardiology MARYANA     Echocardiogram Complete     Orders Placed This Encounter   Medications     lisinopril (ZESTRIL) 20 MG tablet     Sig: Take 1 tablet (20 mg) by mouth daily     Dispense:  90 tablet     Refill:  3     metoprolol succinate ER (TOPROL XL) 100 MG 24 hr tablet     Sig: Take 1 tablet (100 mg) by mouth daily     Dispense:  90 tablet     Refill:  3     Medications Discontinued During This Encounter   Medication Reason     lisinopril (ZESTRIL) 10 MG tablet Reorder (No AVS / No eCancel)     metoprolol succinate ER (TOPROL XL) 50 MG 24 hr tablet        10 year ASCVD risk: The 10-year ASCVD risk score (Soledad HARRIS, et al., 2019) is: 1%    Values used to calculate the score:      Age: 42 years      Sex:  Male      Is Non- : No      Diabetic: No      Tobacco smoker: No      Systolic Blood Pressure: 116 mmHg      Is BP treated: Yes      HDL Cholesterol: 68 mg/dL      Total Cholesterol: 214 mg/dL    Encounter Diagnosis   Name Primary?     Cardiomyopathy, unspecified type (H)        CURRENT MEDICATIONS:  Current Outpatient Medications   Medication Sig Dispense Refill     lisinopril (ZESTRIL) 20 MG tablet Take 1 tablet (20 mg) by mouth daily 90 tablet 3     metoprolol succinate ER (TOPROL XL) 100 MG 24 hr tablet Take 1 tablet (100 mg) by mouth daily 90 tablet 3     terbinafine (LAMISIL) 250 MG tablet Take 1 tablet (250 mg) by mouth daily 90 tablet 0       ALLERGIES     Allergies   Allergen Reactions     No Known Drug Allergies        PAST MEDICAL HISTORY:  Past Medical History:   Diagnosis Date     Cardiomyopathy (H)      Dysrhythmia, cardiac      NO ACTIVE PROBLEMS        PAST SURGICAL HISTORY:  Past Surgical History:   Procedure Laterality Date     ZZC NONSPECIFIC PROCEDURE      Cyst removal on back       FAMILY HISTORY:  Family History   Problem Relation Age of Onset     Diabetes Mother      Hypertension Father      Alcohol/Drug Father      Lipids Father        SOCIAL HISTORY:  Social History     Socioeconomic History     Marital status:      Spouse name: None     Number of children: None     Years of education: None     Highest education level: None   Occupational History     Occupation:    Tobacco Use     Smoking status: Former     Packs/day: 0.00     Years: 0.00     Pack years: 0.00     Types: Cigarettes     Smokeless tobacco: Never     Tobacco comments:     Quit yrs ago-2002   Vaping Use     Vaping status: Never Used   Substance and Sexual Activity     Alcohol use: Yes     Comment: 1 beer per day; few on Friday/Saturday night     Drug use: No     Sexual activity: Yes     Partners: Female     Birth control/protection: None   Other Topics Concern      Service No      "Blood Transfusions No     Caffeine Concern Yes     Comment: affects irregular heart beat     Occupational Exposure Yes     Comment: noise and heat      Hobby Hazards No     Sleep Concern No     Stress Concern No     Weight Concern No     Special Diet No     Back Care No     Exercise Yes     Comment: occ     Bike Helmet No     Seat Belt Yes     Self-Exams Yes     Parent/sibling w/ CABG, MI or angioplasty before 65F 55M? No       Review of Systems:  Skin:        Eyes:       ENT:       Respiratory:  Negative shortness of breath;cough;wheezing  Cardiovascular:  Negative;palpitations;chest pain;edema;lightheadedness;dizziness;syncope or near-syncope    Gastroenterology:      Genitourinary:       Musculoskeletal:       Neurologic:  Negative numbness or tingling of feet;numbness or tingling of hands  Psychiatric:       Heme/Lymph/Imm:       Endocrine:         Physical Exam:  Vitals: /64 (BP Location: Right arm, Patient Position: Sitting, Cuff Size: Adult Regular)   Pulse 70   Ht 1.746 m (5' 8.75\")   Wt 78.8 kg (173 lb 11.2 oz)   SpO2 99%   BMI 25.84 kg/m      Constitutional:  cooperative, alert and oriented, well developed, well nourished, in no acute distress   Fit in appearance    Skin:  warm and dry to the touch, no apparent skin lesions or masses noted        Head:  normocephalic        Eyes:  pupils equal and round, conjunctivae and lids unremarkable, sclera white, no xanthalasma, EOMS intact, no nystagmus        ENT:  no pallor or cyanosis   masked    Neck:  carotid pulses are full and equal bilaterally, JVP normal, no carotid bruit        Chest:  normal breath sounds, clear to auscultation, normal A-P diameter, normal symmetry, normal respiratory excursion, no use of accessory muscles        Cardiac: regular rhythm, normal S1/S2, no S3 or S4, apical impulse not displaced, no murmurs, gallops or rubs                  Abdomen:           Vascular: pulses full and equal                                  "     Extremities and Muscular Skeletal:  no edema           Neurological:  no gross motor deficits        Psych:  affect appropriate, oriented to time, person and place     Recent Lab Results:  LIPID RESULTS:  Lab Results   Component Value Date    CHOL 214 (H) 03/16/2022    CHOL 211 (H) 01/21/2021    HDL 68 03/16/2022    HDL 57 01/21/2021     (H) 03/16/2022     (H) 01/21/2021    TRIG 98 03/16/2022    TRIG 118 01/21/2021    CHOLHDLRATIO 3.0 09/04/2012       LIVER ENZYME RESULTS:  Lab Results   Component Value Date    AST 30 01/21/2021    ALT 65 01/21/2021       CBC RESULTS:  Lab Results   Component Value Date    WBC 6.0 11/09/2010    RBC 4.79 11/09/2010    HGB 13.3 11/09/2010    HCT 42.1 11/09/2010    MCV 88 11/09/2010    MCH 27.8 11/09/2010    MCHC 31.6 11/09/2010    RDW 13.5 11/09/2010     11/09/2010       BMP RESULTS:  Lab Results   Component Value Date     03/16/2022     11/09/2010    POTASSIUM 4.2 03/16/2022    POTASSIUM 4.6 11/09/2010    CHLORIDE 102 03/16/2022    CHLORIDE 103 11/09/2010    CO2 31 03/16/2022    CO2 32 11/09/2010    ANIONGAP 3 03/16/2022    ANIONGAP 7.8 11/09/2010     (H) 03/16/2022    GLC 89 11/09/2017    BUN 15 03/16/2022    BUN 23 11/09/2010    CR 1.05 03/16/2022    CR 1.22 11/09/2010    GFRESTIMATED >90 03/16/2022    GFRESTIMATED 70 11/09/2010    GFRESTBLACK 84 11/09/2010    KERWIN 9.6 03/16/2022    KERWIN 9.7 11/09/2010        A1C RESULTS:  No results found for: A1C    INR RESULTS:  No results found for: INR      Thony Mcfadden MD, FACC    CC  Thony Mcfadden MD  [address] [place] [id number]

## 2023-04-10 NOTE — LETTER
"4/10/2023    [address] [address]    RE: John Nichols       Dear Colleague,     I had the pleasure of seeing John Nichols in the [facility].  HISTORY:    John Nichols is a very pleasant 42-year-old male with a history of nonischemic cardiomyopathy diagnosed in 2010, suspected to be secondary to heavy alcohol use.  Initially his ejection fraction was 20% but with reduction of alcohol use and initiation of medications his EF rachna to about 45%.  He is here today for his annual visit.    Today Herson reports that he continues to do well.  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  He also states that he is physically very active.  He works out at the gym regularly and does some jogging, although less recently since he strained his low back.  He feels that his energy and stamina are completely normal and, interestingly, even when he had a low ejection fraction he had normal energy with the ability to jog 20 miles without symptoms.    Herson continues to consume a fair amount of alcohol.  He states that most days he tries to keep the count of his daily beers \"in the single digits\".  When I once again pointed out that this is not a healthy amount of alcohol to be using he reports that he has been thinking of cutting back or giving this up entirely, but he is not committed to it.  I reminded him that alcohol is a cardiotoxin as well as causing liver toxicity and it certainly would be in his best health interest to give it up or cut back considerably.    Herson denies exertional chest pain, palpitations, PND/orthopnea, syncope or near syncope, strokelike symptoms, peripheral edema, or claudication.  He feels that his energy and stamina are above average for his age are gradually decreasing as the years go by.    A recent echocardiogram was completed and shows an ejection fraction of 45 to 50% with " mild LV dilation at 6.1 cm.  No valvular dysfunction is identified.  This is essentially unchanged from his last echo a year ago.      ASSESSMENT/PLAN:    1.  Nonischemic cardiomyopathy, suspected secondary to alcohol use although certainly possibly idiopathic.  Today I have increased his dose of lisinopril from 10 mg to 20 mg and I we will have him also double his metoprolol from 50 mg daily to 100 mg daily.  I warned him of potential side effects and asked him to contact me if he experiences problems.  We also spoke briefly about the possibility of using either Entresto or Jardiance, but both are likely to be very expensive and likely not of great benefit to him since he has excellent energy and exercise capacity and his ejection fraction has been stable for at least a decade.  Should his ejection fraction decrease in the future these agents could be reconsidered.  As mentioned above I encouraged alcohol cessation.    Thank you for inviting me to participate in the care of your patient.  Please don't hesitate to call if I can be of further assistance.  30 minutes were spent today reviewing the chart and other records, interviewing and examining the patient, and documenting our visit.    Chart documentation was completed, in part, with [employer] voice-recognition software. Even though reviewed, some grammatical, spelling, and word errors may remain.       Orders Placed This Encounter   Procedures    Basic metabolic panel    Follow-Up with Cardiology MARYANA    Echocardiogram Complete     Orders Placed This Encounter   Medications    lisinopril (ZESTRIL) 20 MG tablet     Sig: Take 1 tablet (20 mg) by mouth daily     Dispense:  90 tablet     Refill:  3    metoprolol succinate ER (TOPROL XL) 100 MG 24 hr tablet     Sig: Take 1 tablet (100 mg) by mouth daily     Dispense:  90 tablet     Refill:  3     Medications Discontinued During This Encounter   Medication Reason    lisinopril (ZESTRIL) 10 MG tablet Reorder (No AVS / No  eCancel)    metoprolol succinate ER (TOPROL XL) 50 MG 24 hr tablet        10 year ASCVD risk: The 10-year ASCVD risk score (Soledad HARRIS, et al., 2019) is: 1%    Values used to calculate the score:      Age: 42 years      Sex: Male      Is Non- : No      Diabetic: No      Tobacco smoker: No      Systolic Blood Pressure: 116 mmHg      Is BP treated: Yes      HDL Cholesterol: 68 mg/dL      Total Cholesterol: 214 mg/dL    Encounter Diagnosis   Name Primary?    Cardiomyopathy, unspecified type (H)        CURRENT MEDICATIONS:  Current Outpatient Medications   Medication Sig Dispense Refill    lisinopril (ZESTRIL) 20 MG tablet Take 1 tablet (20 mg) by mouth daily 90 tablet 3    metoprolol succinate ER (TOPROL XL) 100 MG 24 hr tablet Take 1 tablet (100 mg) by mouth daily 90 tablet 3    terbinafine (LAMISIL) 250 MG tablet Take 1 tablet (250 mg) by mouth daily 90 tablet 0       ALLERGIES     Allergies   Allergen Reactions    No Known Drug Allergies        PAST MEDICAL HISTORY:  Past Medical History:   Diagnosis Date    Cardiomyopathy (H)     Dysrhythmia, cardiac     NO ACTIVE PROBLEMS        PAST SURGICAL HISTORY:  Past Surgical History:   Procedure Laterality Date    ZZC NONSPECIFIC PROCEDURE      Cyst removal on back       FAMILY HISTORY:  Family History   Problem Relation Age of Onset    Diabetes Mother     Hypertension Father     Alcohol/Drug Father     Lipids Father        SOCIAL HISTORY:  Social History     Socioeconomic History    Marital status:      Spouse name: None    Number of children: None    Years of education: None    Highest education level: None   Occupational History    Occupation:    Tobacco Use    Smoking status: Former     Packs/day: 0.00     Years: 0.00     Pack years: 0.00     Types: Cigarettes    Smokeless tobacco: Never    Tobacco comments:     Quit yrs ago-2002   Vaping Use    Vaping status: Never Used   Substance and Sexual Activity    Alcohol use: Yes      "Comment: 1 beer per day; few on Friday/Saturday night    Drug use: No    Sexual activity: Yes     Partners: Female     Birth control/protection: None   Other Topics Concern     Service No    Blood Transfusions No    Caffeine Concern Yes     Comment: affects irregular heart beat    Occupational Exposure Yes     Comment: noise and heat     Hobby Hazards No    Sleep Concern No    Stress Concern No    Weight Concern No    Special Diet No    Back Care No    Exercise Yes     Comment: occ    Bike Helmet No    Seat Belt Yes    Self-Exams Yes    Parent/sibling w/ CABG, MI or angioplasty before 65F 55M? No       Review of Systems:  Skin:        Eyes:       ENT:       Respiratory:  Negative shortness of breath;cough;wheezing  Cardiovascular:  Negative;palpitations;chest pain;edema;lightheadedness;dizziness;syncope or near-syncope    Gastroenterology:      Genitourinary:       Musculoskeletal:       Neurologic:  Negative numbness or tingling of feet;numbness or tingling of hands  Psychiatric:       Heme/Lymph/Imm:       Endocrine:         Physical Exam:  Vitals: /64 (BP Location: Right arm, Patient Position: Sitting, Cuff Size: Adult Regular)   Pulse 70   Ht 1.746 m (5' 8.75\")   Wt 78.8 kg (173 lb 11.2 oz)   SpO2 99%   BMI 25.84 kg/m      Constitutional:  cooperative, alert and oriented, well developed, well nourished, in no acute distress   Fit in appearance    Skin:  warm and dry to the touch, no apparent skin lesions or masses noted        Head:  normocephalic        Eyes:  pupils equal and round, conjunctivae and lids unremarkable, sclera white, no xanthalasma, EOMS intact, no nystagmus        ENT:  no pallor or cyanosis   masked    Neck:  carotid pulses are full and equal bilaterally, JVP normal, no carotid bruit        Chest:  normal breath sounds, clear to auscultation, normal A-P diameter, normal symmetry, normal respiratory excursion, no use of accessory muscles        Cardiac: regular rhythm, normal " S1/S2, no S3 or S4, apical impulse not displaced, no murmurs, gallops or rubs                  Abdomen:           Vascular: pulses full and equal                                      Extremities and Muscular Skeletal:  no edema           Neurological:  no gross motor deficits        Psych:  affect appropriate, oriented to time, person and place     Recent Lab Results:  LIPID RESULTS:  Lab Results   Component Value Date    CHOL 214 (H) 03/16/2022    CHOL 211 (H) 01/21/2021    HDL 68 03/16/2022    HDL 57 01/21/2021     (H) 03/16/2022     (H) 01/21/2021    TRIG 98 03/16/2022    TRIG 118 01/21/2021    CHOLHDLRATIO 3.0 09/04/2012       LIVER ENZYME RESULTS:  Lab Results   Component Value Date    AST 30 01/21/2021    ALT 65 01/21/2021       CBC RESULTS:  Lab Results   Component Value Date    WBC 6.0 11/09/2010    RBC 4.79 11/09/2010    HGB 13.3 11/09/2010    HCT 42.1 11/09/2010    MCV 88 11/09/2010    MCH 27.8 11/09/2010    MCHC 31.6 11/09/2010    RDW 13.5 11/09/2010     11/09/2010       BMP RESULTS:  Lab Results   Component Value Date     03/16/2022     11/09/2010    POTASSIUM 4.2 03/16/2022    POTASSIUM 4.6 11/09/2010    CHLORIDE 102 03/16/2022    CHLORIDE 103 11/09/2010    CO2 31 03/16/2022    CO2 32 11/09/2010    ANIONGAP 3 03/16/2022    ANIONGAP 7.8 11/09/2010     (H) 03/16/2022    GLC 89 11/09/2017    BUN 15 03/16/2022    BUN 23 11/09/2010    CR 1.05 03/16/2022    CR 1.22 11/09/2010    GFRESTIMATED >90 03/16/2022    GFRESTIMATED 70 11/09/2010    GFRESTBLACK 84 11/09/2010    KERWIN 9.6 03/16/2022    KERWIN 9.7 11/09/2010        A1C RESULTS:  No results found for: A1C    INR RESULTS:  No results found for: INR      Thony Mcfadden MD, FACC    CC  Thony Mcfadden MD  [address] [place] [id number]      Thank you for allowing me to participate in the care of your patient.      Sincerely,     Thony Mcfadden MD     [facility]  [facility]

## 2023-06-01 ENCOUNTER — HEALTH MAINTENANCE LETTER (OUTPATIENT)
Age: 43
End: 2023-06-01

## 2023-08-01 ENCOUNTER — OFFICE VISIT (OUTPATIENT)
Dept: INTERNAL MEDICINE | Facility: CLINIC | Age: 43
End: 2023-08-01
Payer: COMMERCIAL

## 2023-08-01 VITALS
WEIGHT: 159.6 LBS | BODY MASS INDEX: 24.19 KG/M2 | RESPIRATION RATE: 16 BRPM | HEIGHT: 68 IN | HEART RATE: 48 BPM | SYSTOLIC BLOOD PRESSURE: 100 MMHG | DIASTOLIC BLOOD PRESSURE: 60 MMHG | OXYGEN SATURATION: 98 % | TEMPERATURE: 96.9 F

## 2023-08-01 DIAGNOSIS — Z00.00 ROUTINE GENERAL MEDICAL EXAMINATION AT A HEALTH CARE FACILITY: ICD-10-CM

## 2023-08-01 DIAGNOSIS — I42.9 CARDIOMYOPATHY, UNSPECIFIED TYPE (H): Primary | ICD-10-CM

## 2023-08-01 DIAGNOSIS — I49.9 CARDIAC ARRHYTHMIA, UNSPECIFIED CARDIAC ARRHYTHMIA TYPE: ICD-10-CM

## 2023-08-01 DIAGNOSIS — H93.13 TINNITUS, BILATERAL: ICD-10-CM

## 2023-08-01 PROCEDURE — 99396 PREV VISIT EST AGE 40-64: CPT | Performed by: INTERNAL MEDICINE

## 2023-08-01 RX ORDER — LISINOPRIL 20 MG/1
20 TABLET ORAL DAILY
Qty: 90 TABLET | Refills: 3 | Status: SHIPPED | OUTPATIENT
Start: 2023-08-01

## 2023-08-01 RX ORDER — METOPROLOL SUCCINATE 100 MG/1
100 TABLET, EXTENDED RELEASE ORAL DAILY
Qty: 90 TABLET | Refills: 3 | Status: SHIPPED | OUTPATIENT
Start: 2023-08-01 | End: 2024-07-30

## 2023-08-01 ASSESSMENT — PAIN SCALES - GENERAL: PAINLEVEL: NO PAIN (0)

## 2023-08-01 ASSESSMENT — ENCOUNTER SYMPTOMS
COUGH: 0
DYSURIA: 0
DIARRHEA: 0
ARTHRALGIAS: 0
ABDOMINAL PAIN: 0
DIZZINESS: 0
CONSTIPATION: 0
SORE THROAT: 0
JOINT SWELLING: 0
HEARTBURN: 0
CHILLS: 0
SHORTNESS OF BREATH: 0
HEMATURIA: 0
FEVER: 0
MYALGIAS: 0
FREQUENCY: 0
NAUSEA: 0
NERVOUS/ANXIOUS: 0
EYE PAIN: 0
HEADACHES: 0
HEMATOCHEZIA: 0
PARESTHESIAS: 0
WEAKNESS: 0
PALPITATIONS: 0

## 2023-08-01 NOTE — PROGRESS NOTES
SUBJECTIVE:   CC: Herson is an 43 year old who presents for preventative health visit.       8/1/2023     2:00 PM   Additional Questions   Roomed by Zenaida ANTUNEZ       Healthy Habits:     Getting at least 3 servings of Calcium per day:  Yes    Bi-annual eye exam:  NO    Dental care twice a year:  Yes    Sleep apnea or symptoms of sleep apnea:  None    Diet:  Regular (no restrictions)    Frequency of exercise:  None    Taking medications regularly:  Yes    Medication side effects:  None    Additional concerns today:  Yes                -------------------------------------      Social History     Tobacco Use    Smoking status: Former     Packs/day: 0.00     Years: 0.00     Pack years: 0.00     Types: Cigarettes    Smokeless tobacco: Never    Tobacco comments:     Quit yrs ago-2002   Substance Use Topics    Alcohol use: Yes     Comment: 1 beer per day; few on Friday/Saturday night             8/1/2023    12:31 PM   Alcohol Use   Prescreen: >3 drinks/day or >7 drinks/week? Yes   AUDIT SCORE  16         8/1/2023    12:31 PM   AUDIT - Alcohol Use Disorders Identification Test - Reproduced from the World Health Organization Audit 2001 (Second Edition)   1.  How often do you have a drink containing alcohol? 4 or more times a week   2.  How many drinks containing alcohol do you have on a typical day when you are drinking? 10 or more   3.  How often do you have five or more drinks on one occasion? Daily or almost daily   4.  How often during the last year have you found that you were not able to stop drinking once you had started? Never   5.  How often during the last year have you failed to do what was normally expected of you because of drinking? Never   6.  How often during the last year have you needed a first drink in the morning to get yourself going after a heavy drinking session? Never   7.  How often during the last year have you had a feeling of guilt or remorse after drinking? Never   8.  How often during the last  year have you been unable to remember what happened the night before because of your drinking? Never   9.  Have you or someone else been injured because of your drinking? No   10. Has a relative, friend, doctor or other health care worker been concerned about your drinking or suggested you cut down? Yes, during the last year   TOTAL SCORE 16       Last PSA: No results found for: PSA    Reviewed orders with patient. Reviewed health maintenance and updated orders accordingly - Yes  Lab work is in process  Labs reviewed in EPIC  BP Readings from Last 3 Encounters:   08/01/23 100/60   04/10/23 116/64   04/25/22 118/62    Wt Readings from Last 3 Encounters:   08/01/23 72.4 kg (159 lb 9.6 oz)   04/10/23 78.8 kg (173 lb 11.2 oz)   04/25/22 77 kg (169 lb 11.2 oz)                  Patient Active Problem List   Diagnosis    Dermatophytosis of nail    Dysrhythmia, cardiac    Onychomycosis     Past Surgical History:   Procedure Laterality Date    [facility] NONSPECIFIC PROCEDURE      Cyst removal on back       Social History     Tobacco Use    Smoking status: Former     Packs/day: 0.00     Years: 0.00     Additional pack years: 0.00     Total pack years: 0.00     Types: Cigarettes    Smokeless tobacco: Never    Tobacco comments:     Quit yrs ago-2002   Substance Use Topics    Alcohol use: Yes     Comment: 1 beer per day; few on Friday/Saturday night     Family History   Problem Relation Age of Onset    Diabetes Mother     Hypertension Father     Alcohol/Drug Father     Lipids Father          Current Outpatient Medications   Medication Sig Dispense Refill    lisinopril (ZESTRIL) 20 MG tablet Take 1 tablet (20 mg) by mouth daily 90 tablet 3    metoprolol succinate ER (TOPROL XL) 100 MG 24 hr tablet Take 1 tablet (100 mg) by mouth daily 90 tablet 3     Allergies   Allergen Reactions    No Known Drug Allergy        Reviewed and updated as needed this visit by clinical staff   Tobacco  Allergies  Meds              Reviewed and updated  "as needed this visit by Provider                 Past Medical History:   Diagnosis Date    Cardiomyopathy (H)     Dysrhythmia, cardiac     NO ACTIVE PROBLEMS       Past Surgical History:   Procedure Laterality Date    [facility] NONSPECIFIC PROCEDURE      Cyst removal on back       Review of Systems   Constitutional:  Negative for chills and fever.   HENT:  Negative for congestion, ear pain, hearing loss and sore throat.    Eyes:  Negative for pain and visual disturbance.   Respiratory:  Negative for cough and shortness of breath.    Cardiovascular:  Negative for chest pain, palpitations and peripheral edema.   Gastrointestinal:  Negative for abdominal pain, constipation, diarrhea, heartburn, hematochezia and nausea.   Genitourinary:  Negative for dysuria, frequency, genital sores, hematuria, impotence, penile discharge and urgency.   Musculoskeletal:  Negative for arthralgias, joint swelling and myalgias.   Skin:  Negative for rash.   Neurological:  Negative for dizziness, weakness, headaches and paresthesias.   Psychiatric/Behavioral:  Negative for mood changes. The patient is not nervous/anxious.      Patient knowledges some irregular heartbeats.  Occasionally has brief episodes of lightheadedness.  Has had no syncope or near syncope.  Last echocardiogram demonstrated ejection fraction of 45 to 50%.  He follows with his cardiologist regularly.    OBJECTIVE:   /60 (BP Location: Right arm, Patient Position: Chair)   Pulse (!) 48   Temp 96.9  F (36.1  C) (Temporal)   Resp 16   Ht 1.727 m (5' 8\")   Wt 72.4 kg (159 lb 9.6 oz)   SpO2 98%   BMI 24.27 kg/m      Physical Exam  GENERAL: healthy, alert and no distress  EYES: Eyes grossly normal to inspection, PERRL and conjunctivae and sclerae normal  HENT: ear canals and TM's normal, nose and mouth without ulcers or lesions  NECK: no adenopathy, no asymmetry, masses, or scars and thyroid normal to palpation  RESP: lungs clear to auscultation - no rales, rhonchi or " wheezes  CV: regular rate and rhythm, normal S1 S2, no S3 or S4, no murmur, click or rub, no peripheral edema and peripheral pulses strong  ABDOMEN: soft, nontender, no hepatosplenomegaly, no masses and bowel sounds normal  MS: no gross musculoskeletal defects noted, no edema  SKIN: no suspicious lesions or rashes  NEURO: Normal strength and tone, mentation intact and speech normal  PSYCH: mentation appears normal, affect normal/bright    Diagnostic Test Results:  Labs reviewed in Epic  No results found for this or any previous visit (from the past 24 hour(s)).    ASSESSMENT/PLAN:       ICD-10-CM    1. Cardiomyopathy, unspecified type (H)  I42.9 lisinopril (ZESTRIL) 20 MG tablet     metoprolol succinate ER (TOPROL XL) 100 MG 24 hr tablet      2. Routine general medical examination at a health care facility  Z00.00       3. Cardiac arrhythmia, unspecified cardiac arrhythmia type  I49.9 Adult Holter Monitor 48 hour      4. Tinnitus, bilateral  H93.13           Patient has been advised of split billing requirements and indicates understanding: Yes      COUNSELING:   Reviewed preventive health counseling, as reflected in patient instructions       Regular exercise       Healthy diet/nutrition       Vision screening       Hearing screening        I have reviewed the patient's vaccination schedule and discussed the benefits of prophylactic vaccination in detail.  I recommend the patient contact their pharmacist for vaccinations.  Discussed that most insurance companies now favor reimbursement to the pharmacies and it will financially behoove the patient to have vaccinations performed at their pharmacy.        He reports that he has quit smoking. His smoking use included cigarettes. He has never used smokeless tobacco.        Louis Weeks DO  [facility]

## 2023-08-02 ENCOUNTER — HOSPITAL ENCOUNTER (OUTPATIENT)
Dept: CARDIOLOGY | Facility: CLINIC | Age: 43
Discharge: HOME | End: 2023-08-02
Attending: INTERNAL MEDICINE | Admitting: INTERNAL MEDICINE
Payer: COMMERCIAL

## 2023-08-02 DIAGNOSIS — I49.9 CARDIAC ARRHYTHMIA, UNSPECIFIED CARDIAC ARRHYTHMIA TYPE: ICD-10-CM

## 2023-08-02 PROCEDURE — 93225 XTRNL ECG REC<48 HRS REC: CPT

## 2023-08-02 PROCEDURE — 93226 XTRNL ECG REC<48 HR SCAN A/R: CPT

## 2023-08-02 PROCEDURE — 93227 XTRNL ECG REC<48 HR R&I: CPT | Performed by: INTERNAL MEDICINE

## 2024-07-09 ENCOUNTER — PATIENT OUTREACH (OUTPATIENT)
Dept: CARE COORDINATION | Facility: CLINIC | Age: 44
End: 2024-07-09
Payer: COMMERCIAL

## 2024-07-23 ENCOUNTER — PATIENT OUTREACH (OUTPATIENT)
Dept: CARE COORDINATION | Facility: CLINIC | Age: 44
End: 2024-07-23
Payer: COMMERCIAL

## 2024-07-29 DIAGNOSIS — I42.9 CARDIOMYOPATHY, UNSPECIFIED TYPE (H): ICD-10-CM

## 2024-07-30 RX ORDER — METOPROLOL SUCCINATE 100 MG/1
100 TABLET, EXTENDED RELEASE ORAL DAILY
Qty: 90 TABLET | Refills: 3 | Status: SHIPPED | OUTPATIENT
Start: 2024-07-30

## 2024-11-14 ENCOUNTER — OFFICE VISIT (OUTPATIENT)
Dept: FAMILY MEDICINE | Facility: CLINIC | Age: 44
End: 2024-11-14
Payer: COMMERCIAL

## 2024-11-14 VITALS
RESPIRATION RATE: 16 BRPM | OXYGEN SATURATION: 97 % | HEIGHT: 70 IN | BODY MASS INDEX: 23.85 KG/M2 | TEMPERATURE: 98.1 F | WEIGHT: 166.6 LBS | DIASTOLIC BLOOD PRESSURE: 60 MMHG | HEART RATE: 66 BPM | SYSTOLIC BLOOD PRESSURE: 98 MMHG

## 2024-11-14 DIAGNOSIS — Z00.00 ROUTINE GENERAL MEDICAL EXAMINATION AT A HEALTH CARE FACILITY: Primary | ICD-10-CM

## 2024-11-14 DIAGNOSIS — I42.9 SECONDARY CARDIOMYOPATHY (H): ICD-10-CM

## 2024-11-14 DIAGNOSIS — I49.9 CARDIAC ARRHYTHMIA, UNSPECIFIED CARDIAC ARRHYTHMIA TYPE: ICD-10-CM

## 2024-11-14 DIAGNOSIS — F10.20 ALCOHOLISM (H): ICD-10-CM

## 2024-11-14 LAB
ALBUMIN SERPL BCG-MCNC: 5.5 G/DL (ref 3.5–5.2)
ALP SERPL-CCNC: 42 U/L (ref 40–150)
ALT SERPL W P-5'-P-CCNC: 125 U/L (ref 0–70)
ANION GAP SERPL CALCULATED.3IONS-SCNC: 11 MMOL/L (ref 7–15)
AST SERPL W P-5'-P-CCNC: 85 U/L (ref 0–45)
BILIRUB SERPL-MCNC: 0.4 MG/DL
BUN SERPL-MCNC: 7.4 MG/DL (ref 6–20)
CALCIUM SERPL-MCNC: 10.1 MG/DL (ref 8.8–10.4)
CHLORIDE SERPL-SCNC: 102 MMOL/L (ref 98–107)
CHOLEST SERPL-MCNC: 233 MG/DL
CREAT SERPL-MCNC: 0.93 MG/DL (ref 0.67–1.17)
EGFRCR SERPLBLD CKD-EPI 2021: >90 ML/MIN/1.73M2
ERYTHROCYTE [DISTWIDTH] IN BLOOD BY AUTOMATED COUNT: 13.9 % (ref 10–15)
FASTING STATUS PATIENT QL REPORTED: YES
FASTING STATUS PATIENT QL REPORTED: YES
GLUCOSE SERPL-MCNC: 87 MG/DL (ref 70–99)
HCO3 SERPL-SCNC: 25 MMOL/L (ref 22–29)
HCT VFR BLD AUTO: 40 % (ref 40–53)
HDLC SERPL-MCNC: 78 MG/DL
HGB BLD-MCNC: 13.4 G/DL (ref 13.3–17.7)
LDLC SERPL CALC-MCNC: 132 MG/DL
MCH RBC QN AUTO: 29.9 PG (ref 26.5–33)
MCHC RBC AUTO-ENTMCNC: 33.5 G/DL (ref 31.5–36.5)
MCV RBC AUTO: 89 FL (ref 78–100)
NONHDLC SERPL-MCNC: 155 MG/DL
PLATELET # BLD AUTO: 269 10E3/UL (ref 150–450)
POTASSIUM SERPL-SCNC: 4.4 MMOL/L (ref 3.4–5.3)
PROT SERPL-MCNC: 8.4 G/DL (ref 6.4–8.3)
RBC # BLD AUTO: 4.48 10E6/UL (ref 4.4–5.9)
SODIUM SERPL-SCNC: 138 MMOL/L (ref 135–145)
TRIGL SERPL-MCNC: 115 MG/DL
TSH SERPL DL<=0.005 MIU/L-ACNC: 1.02 UIU/ML (ref 0.3–4.2)
WBC # BLD AUTO: 4.1 10E3/UL (ref 4–11)

## 2024-11-14 PROCEDURE — 85027 COMPLETE CBC AUTOMATED: CPT | Performed by: FAMILY MEDICINE

## 2024-11-14 PROCEDURE — 84443 ASSAY THYROID STIM HORMONE: CPT | Performed by: FAMILY MEDICINE

## 2024-11-14 PROCEDURE — 90715 TDAP VACCINE 7 YRS/> IM: CPT | Performed by: FAMILY MEDICINE

## 2024-11-14 PROCEDURE — 80053 COMPREHEN METABOLIC PANEL: CPT | Performed by: FAMILY MEDICINE

## 2024-11-14 PROCEDURE — 80061 LIPID PANEL: CPT | Performed by: FAMILY MEDICINE

## 2024-11-14 PROCEDURE — 90471 IMMUNIZATION ADMIN: CPT | Performed by: FAMILY MEDICINE

## 2024-11-14 PROCEDURE — 36415 COLL VENOUS BLD VENIPUNCTURE: CPT | Performed by: FAMILY MEDICINE

## 2024-11-14 PROCEDURE — 99396 PREV VISIT EST AGE 40-64: CPT | Mod: 25 | Performed by: FAMILY MEDICINE

## 2024-11-14 PROCEDURE — 99213 OFFICE O/P EST LOW 20 MIN: CPT | Mod: 25 | Performed by: FAMILY MEDICINE

## 2024-11-14 SDOH — HEALTH STABILITY: PHYSICAL HEALTH: ON AVERAGE, HOW MANY MINUTES DO YOU ENGAGE IN EXERCISE AT THIS LEVEL?: 60 MIN

## 2024-11-14 SDOH — HEALTH STABILITY: PHYSICAL HEALTH: ON AVERAGE, HOW MANY DAYS PER WEEK DO YOU ENGAGE IN MODERATE TO STRENUOUS EXERCISE (LIKE A BRISK WALK)?: 7 DAYS

## 2024-11-14 ASSESSMENT — PAIN SCALES - GENERAL: PAINLEVEL_OUTOF10: NO PAIN (0)

## 2024-11-14 ASSESSMENT — SOCIAL DETERMINANTS OF HEALTH (SDOH): HOW OFTEN DO YOU GET TOGETHER WITH FRIENDS OR RELATIVES?: NEVER

## 2024-11-14 NOTE — PROGRESS NOTES
Preventive Care Visit  [facility]  Carole Emerson MD, Family Medicine  Nov 14, 2024      Assessment & Plan     (Z00.00) Routine general medical examination at a health care facility  (primary encounter diagnosis)  Comment: 43 y/o M with hx of ETOh abuse with cardiomyopathy and reduced EF here for general well check. He is in his normal state of health.     (I49.9) Cardiac arrhythmia, unspecified cardiac arrhythmia type  Comment: Maintain metoprolol for now.     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 Pt reports mother with similar issue. Discussed that it sounds like he has multiple risk factors including a father with cardiac ischemic disease. WE discussed obtaining an ECHO. Last one was in April 2023 and EF was 45-50. There as mild hypokinesia of the left ventricle. It was stable form his prior study. Amenable to re-imaging.    Plan: Echocardiogram Complete, CBC with platelets,         Comprehensive metabolic panel (BMP + Alb, Alk         Phos, ALT, AST, Total. Bili, TP), Lipid panel         reflex to direct LDL Fasting, TSH with free T4         reflex    (F10.20) Alcoholism (H)  Comment: Not contemplative at this time for any changes to usage pattern..       Patient has been advised of split billing requirements and indicates understanding: Yes        Counseling  Appropriate preventive services were addressed with this patient via screening, questionnaire, or discussion as appropriate for fall prevention, nutrition, physical activity, Tobacco-use cessation, social engagement, weight loss and cognition.  Checklist reviewing preventive services available has been given to the patient.  Reviewed patient's diet, addressing concerns and/or questions.   Patient is at risk for social isolation and has been provided with information about the benefit of social connection.    The patient reports drinking more than 3 alcoholic drinks per day and/or more than 7 drhnks per week. The patient was counseled and given information about possible harmful effects of excessive alcohol intake.    1 yr prn sooner if issues    Subjective   Herson is a 44 year old, presenting for the following:  Physical        11/14/2024     9:26 AM   Additional Questions   Roomed by Zully ACEVEDO  Patient is a 44-year-old male here for annual wellness visit.  He is in his normal state of health overall.  He has secondary cardiomyopathy thought to be related to alcoholism for which he was hospitalized prior.  He was started on metoprolol for that reason and has been stable since.  At 1 point patient was placed on lisinopril which was uptitrated and caused severe hypotension.  He is now off of this medication and doing well.  Chart was mainly prescribed for cardiac remodeling effect.  Patient does report a history of alcoholism with current usage.  He reports 3-4 drinks per day 7 days a week.  He is not at all considering quitting or reducing at this time.    Patient declined all vaccines today with the exception of tetanus      Health Care Directive  Patient does not have a Health Care Directive: Discussed advance care planning with patient; however, patient declined at this time.      11/14/2024   General Health   How would you rate your overall physical health? Good   Feel stress (tense, anxious, or unable to sleep) Not at all            11/14/2024   Nutrition   Three or more servings of calcium each day? Yes   Diet: Other   If other, please elaborate: Low sugar   How many servings of fruit and vegetables per day? 4 or more   How many sweetened beverages each day? 0-1            11/14/2024   Exercise   Days per week of moderate/strenous exercise 7 days   Average minutes spent exercising at this level 60 min            11/14/2024   Social Factors   Frequency of gathering with friends or relatives Never   Worry  food won't last until get money to buy more No   Food not last or not have enough money for food? No   Do you have housing? (Housing is defined as stable permanent housing and does not include staying ouside in a car, in a tent, in an abandoned building, in an overnight shelter, or couch-surfing.) Yes   Are you worried about losing your housing? No   Lack of transportation? No   Unable to get utilities (heat,electricity)? No      (!) SOCIAL CONNECTIONS CONCERN      11/14/2024   Dental   Dentist two times every year? Yes            11/14/2024   TB Screening   Were you born outside of the [place]? No            Today's PHQ-2 Score:       11/14/2024     9:26 AM   PHQ-2 ( 1999 [employer])   Q1: Little interest or pleasure in doing things 0    Q2: Feeling down, depressed or hopeless 0    PHQ-2 Score 0    Q1: Little interest or pleasure in doing things Not at all   Q2: Feeling down, depressed or hopeless Not at all   PHQ-2 Score 0       Patient-reported           11/14/2024   Substance Use   Alcohol more than 3/day or more than 7/wk Yes   How often do you have a drink containing alcohol 4 or more times a week   How many alcohol drinks on typical day 7 to 9   How often do you have 5+ drinks at one occasion Daily or almost daily   Audit 2/3 Score 7   How often not able to stop drinking once started Never   How often failed to do what normally expected Never   How often needed first drink in am after a heavy drinking session Never   How often feeling of guilt or remorse after drinking Never   How often unable to remember what happened the night before Never   Have you or someone else been injured because of your drinking No   Has anyone been concerned or suggested you cut down on drinking Yes, during the last year   TOTAL SCORE - AUDIT 15   Do you use any other substances recreationally? (!) CANNABIS PRODUCTS        Social History     Tobacco Use    Smoking status: Former     Current packs/day: 0.00     Types: Cigarettes    Smokeless  "tobacco: Never    Tobacco comments:     Quit yrs ago-2002   Vaping Use    Vaping status: Never Used   Substance Use Topics    Alcohol use: Yes     Comment: 1 beer per day; few on Friday/Saturday night    Drug use: No           11/14/2024   STI Screening   New sexual partner(s) since last STI/HIV test? (!) YES declined new STI screening      ASCVD Risk   The 10-year ASCVD risk score (Soledad HARRIS, et al., 2019) is: 1%    Values used to calculate the score:      Age: 44 years      Sex: Male      Is Non- : No      Diabetic: No      Tobacco smoker: No      Systolic Blood Pressure: 98 mmHg      Is BP treated: Yes      HDL Cholesterol: 68 mg/dL      Total Cholesterol: 214 mg/dL       Reviewed and updated as needed this visit by Provider                    Review of systems negative/normal     Objective    Exam  BP 98/60 (BP Location: Left arm, Patient Position: Sitting, Cuff Size: Adult Regular)   Pulse 66   Temp 98.1  F (36.7  C) (Temporal)   Resp 16   Ht 1.785 m (5' 10.28\")   Wt 75.6 kg (166 lb 9.6 oz)   SpO2 97%   BMI 23.72 kg/m     Estimated body mass index is 23.72 kg/m  as calculated from the following:    Height as of this encounter: 1.785 m (5' 10.28\").    Weight as of this encounter: 75.6 kg (166 lb 9.6 oz).    Physical Exam  Constitutional:       General: He is not in acute distress.     Appearance: Normal appearance. He is not ill-appearing, toxic-appearing or diaphoretic.   HENT:      Head: Normocephalic.      Right Ear: Tympanic membrane normal.      Left Ear: Tympanic membrane normal.      Nose: Nose normal.      Mouth/Throat:      Mouth: Mucous membranes are moist.   Eyes:      General: No scleral icterus.     Pupils: Pupils are equal, round, and reactive to light.   Cardiovascular:      Rate and Rhythm: Normal rate and regular rhythm.      Pulses: Normal pulses.   Pulmonary:      Effort: Pulmonary effort is normal. No respiratory distress.      Breath sounds: Normal " breath sounds. No stridor. No wheezing, rhonchi or rales.   Abdominal:      General: Abdomen is flat.      Palpations: Abdomen is soft.   Musculoskeletal:         General: Normal range of motion.   Skin:     General: Skin is warm.      Capillary Refill: Capillary refill takes less than 2 seconds.   Neurological:      Mental Status: He is alert. Mental status is at baseline.   Psychiatric:         Mood and Affect: Mood normal.         Behavior: Behavior normal.         Thought Content: Thought content normal.         Judgment: Judgment normal.       Results for orders placed or performed in visit on 11/14/24   CBC with platelets     Status: Normal   Result Value Ref Range    WBC Count 4.1 4.0 - 11.0 10e3/uL    RBC Count 4.48 4.40 - 5.90 10e6/uL    Hemoglobin 13.4 13.3 - 17.7 g/dL    Hematocrit 40.0 40.0 - 53.0 %    MCV 89 78 - 100 fL    MCH 29.9 26.5 - 33.0 pg    MCHC 33.5 31.5 - 36.5 g/dL    RDW 13.9 10.0 - 15.0 %    Platelet Count 269 150 - 450 10e3/uL               Signed Electronically by: Carole Emerson MD

## 2024-12-12 ENCOUNTER — HOSPITAL ENCOUNTER (OUTPATIENT)
Dept: CARDIOLOGY | Facility: CLINIC | Age: 44
Discharge: HOME | End: 2024-12-12
Attending: FAMILY MEDICINE
Payer: COMMERCIAL

## 2024-12-12 DIAGNOSIS — I42.9 SECONDARY CARDIOMYOPATHY (H): ICD-10-CM

## 2024-12-12 LAB — LVEF ECHO: NORMAL

## 2024-12-12 PROCEDURE — 93306 TTE W/DOPPLER COMPLETE: CPT
